# Patient Record
Sex: MALE | Race: WHITE | NOT HISPANIC OR LATINO
[De-identification: names, ages, dates, MRNs, and addresses within clinical notes are randomized per-mention and may not be internally consistent; named-entity substitution may affect disease eponyms.]

---

## 2018-12-07 PROBLEM — Z00.00 ENCOUNTER FOR PREVENTIVE HEALTH EXAMINATION: Status: ACTIVE | Noted: 2018-12-07

## 2019-01-03 ENCOUNTER — APPOINTMENT (OUTPATIENT)
Dept: OPHTHALMOLOGY | Facility: CLINIC | Age: 54
End: 2019-01-03
Payer: MEDICARE

## 2019-01-03 DIAGNOSIS — H53.30 UNSPECIFIED DISORDER OF BINOCULAR VISION: ICD-10-CM

## 2019-01-03 DIAGNOSIS — H43.393 OTHER VITREOUS OPACITIES, BILATERAL: ICD-10-CM

## 2019-01-03 PROCEDURE — 92134 CPTRZ OPH DX IMG PST SGM RTA: CPT

## 2019-01-03 PROCEDURE — 99204 OFFICE O/P NEW MOD 45 MIN: CPT

## 2019-01-03 PROCEDURE — 92083 EXTENDED VISUAL FIELD XM: CPT

## 2021-03-16 ENCOUNTER — TRANSCRIPTION ENCOUNTER (OUTPATIENT)
Age: 56
End: 2021-03-16

## 2022-06-20 ENCOUNTER — NON-APPOINTMENT (OUTPATIENT)
Age: 57
End: 2022-06-20

## 2023-08-12 ENCOUNTER — EMERGENCY (EMERGENCY)
Facility: HOSPITAL | Age: 58
LOS: 1 days | Discharge: HOME | End: 2023-08-12
Attending: EMERGENCY MEDICINE
Payer: MEDICARE

## 2023-08-12 VITALS
SYSTOLIC BLOOD PRESSURE: 150 MMHG | HEIGHT: 70 IN | WEIGHT: 188.94 LBS | RESPIRATION RATE: 18 BRPM | TEMPERATURE: 99 F | OXYGEN SATURATION: 100 % | DIASTOLIC BLOOD PRESSURE: 93 MMHG | HEART RATE: 99 BPM

## 2023-08-12 VITALS
DIASTOLIC BLOOD PRESSURE: 84 MMHG | HEART RATE: 92 BPM | SYSTOLIC BLOOD PRESSURE: 120 MMHG | RESPIRATION RATE: 18 BRPM | OXYGEN SATURATION: 98 %

## 2023-08-12 DIAGNOSIS — F43.22 ADJUSTMENT DISORDER WITH ANXIETY: ICD-10-CM

## 2023-08-12 LAB
ALBUMIN SERPL ELPH-MCNC: 4.9 G/DL — SIGNIFICANT CHANGE UP (ref 3.3–5)
ALP SERPL-CCNC: 62 U/L — SIGNIFICANT CHANGE UP (ref 40–120)
ALT FLD-CCNC: 29 U/L — SIGNIFICANT CHANGE UP (ref 10–45)
AMPHET UR-MCNC: NEGATIVE — SIGNIFICANT CHANGE UP
ANION GAP SERPL CALC-SCNC: 13 MMOL/L — SIGNIFICANT CHANGE UP (ref 5–17)
APAP SERPL-MCNC: <15 UG/ML — SIGNIFICANT CHANGE UP (ref 10–30)
APPEARANCE UR: CLEAR — SIGNIFICANT CHANGE UP
AST SERPL-CCNC: 25 U/L — SIGNIFICANT CHANGE UP (ref 10–40)
BARBITURATES UR SCN-MCNC: NEGATIVE — SIGNIFICANT CHANGE UP
BASOPHILS # BLD AUTO: 0.05 K/UL — SIGNIFICANT CHANGE UP (ref 0–0.2)
BASOPHILS NFR BLD AUTO: 0.9 % — SIGNIFICANT CHANGE UP (ref 0–2)
BENZODIAZ UR-MCNC: NEGATIVE — SIGNIFICANT CHANGE UP
BILIRUB SERPL-MCNC: 0.6 MG/DL — SIGNIFICANT CHANGE UP (ref 0.2–1.2)
BILIRUB UR-MCNC: NEGATIVE — SIGNIFICANT CHANGE UP
BUN SERPL-MCNC: 10 MG/DL — SIGNIFICANT CHANGE UP (ref 7–23)
CALCIUM SERPL-MCNC: 9.6 MG/DL — SIGNIFICANT CHANGE UP (ref 8.4–10.5)
CHLORIDE SERPL-SCNC: 102 MMOL/L — SIGNIFICANT CHANGE UP (ref 96–108)
CO2 SERPL-SCNC: 22 MMOL/L — SIGNIFICANT CHANGE UP (ref 22–31)
COCAINE METAB.OTHER UR-MCNC: NEGATIVE — SIGNIFICANT CHANGE UP
COLOR SPEC: SIGNIFICANT CHANGE UP
CREAT SERPL-MCNC: 1.12 MG/DL — SIGNIFICANT CHANGE UP (ref 0.5–1.3)
DIFF PNL FLD: NEGATIVE — SIGNIFICANT CHANGE UP
EGFR: 76 ML/MIN/1.73M2 — SIGNIFICANT CHANGE UP
EOSINOPHIL # BLD AUTO: 0.15 K/UL — SIGNIFICANT CHANGE UP (ref 0–0.5)
EOSINOPHIL NFR BLD AUTO: 2.6 % — SIGNIFICANT CHANGE UP (ref 0–6)
ETHANOL SERPL-MCNC: <10 MG/DL — SIGNIFICANT CHANGE UP (ref 0–10)
GLUCOSE SERPL-MCNC: 96 MG/DL — SIGNIFICANT CHANGE UP (ref 70–99)
GLUCOSE UR QL: NEGATIVE — SIGNIFICANT CHANGE UP
HCT VFR BLD CALC: 47.6 % — SIGNIFICANT CHANGE UP (ref 39–50)
HGB BLD-MCNC: 16.6 G/DL — SIGNIFICANT CHANGE UP (ref 13–17)
IMM GRANULOCYTES NFR BLD AUTO: 0.4 % — SIGNIFICANT CHANGE UP (ref 0–0.9)
KETONES UR-MCNC: NEGATIVE — SIGNIFICANT CHANGE UP
LEUKOCYTE ESTERASE UR-ACNC: NEGATIVE — SIGNIFICANT CHANGE UP
LYMPHOCYTES # BLD AUTO: 1.34 K/UL — SIGNIFICANT CHANGE UP (ref 1–3.3)
LYMPHOCYTES # BLD AUTO: 23.5 % — SIGNIFICANT CHANGE UP (ref 13–44)
MCHC RBC-ENTMCNC: 34.9 GM/DL — SIGNIFICANT CHANGE UP (ref 32–36)
MCHC RBC-ENTMCNC: 34.9 PG — HIGH (ref 27–34)
MCV RBC AUTO: 100 FL — SIGNIFICANT CHANGE UP (ref 80–100)
METHADONE UR-MCNC: NEGATIVE — SIGNIFICANT CHANGE UP
MONOCYTES # BLD AUTO: 0.5 K/UL — SIGNIFICANT CHANGE UP (ref 0–0.9)
MONOCYTES NFR BLD AUTO: 8.8 % — SIGNIFICANT CHANGE UP (ref 2–14)
NEUTROPHILS # BLD AUTO: 3.64 K/UL — SIGNIFICANT CHANGE UP (ref 1.8–7.4)
NEUTROPHILS NFR BLD AUTO: 63.8 % — SIGNIFICANT CHANGE UP (ref 43–77)
NITRITE UR-MCNC: NEGATIVE — SIGNIFICANT CHANGE UP
NRBC # BLD: 0 /100 WBCS — SIGNIFICANT CHANGE UP (ref 0–0)
OPIATES UR-MCNC: NEGATIVE — SIGNIFICANT CHANGE UP
OXYCODONE UR-MCNC: NEGATIVE — SIGNIFICANT CHANGE UP
PCP SPEC-MCNC: SIGNIFICANT CHANGE UP
PCP UR-MCNC: NEGATIVE — SIGNIFICANT CHANGE UP
PH UR: 8.5 — HIGH (ref 5–8)
PLATELET # BLD AUTO: 163 K/UL — SIGNIFICANT CHANGE UP (ref 150–400)
POTASSIUM SERPL-MCNC: 4.3 MMOL/L — SIGNIFICANT CHANGE UP (ref 3.5–5.3)
POTASSIUM SERPL-SCNC: 4.3 MMOL/L — SIGNIFICANT CHANGE UP (ref 3.5–5.3)
PROT SERPL-MCNC: 7.1 G/DL — SIGNIFICANT CHANGE UP (ref 6–8.3)
PROT UR-MCNC: NEGATIVE — SIGNIFICANT CHANGE UP
RBC # BLD: 4.76 M/UL — SIGNIFICANT CHANGE UP (ref 4.2–5.8)
RBC # FLD: 12.6 % — SIGNIFICANT CHANGE UP (ref 10.3–14.5)
SALICYLATES SERPL-MCNC: <2 MG/DL — LOW (ref 15–30)
SARS-COV-2 RNA SPEC QL NAA+PROBE: SIGNIFICANT CHANGE UP
SODIUM SERPL-SCNC: 137 MMOL/L — SIGNIFICANT CHANGE UP (ref 135–145)
SP GR SPEC: 1.01 — LOW (ref 1.01–1.02)
THC UR QL: NEGATIVE — SIGNIFICANT CHANGE UP
UROBILINOGEN FLD QL: NEGATIVE — SIGNIFICANT CHANGE UP
WBC # BLD: 5.7 K/UL — SIGNIFICANT CHANGE UP (ref 3.8–10.5)
WBC # FLD AUTO: 5.7 K/UL — SIGNIFICANT CHANGE UP (ref 3.8–10.5)

## 2023-08-12 PROCEDURE — 80053 COMPREHEN METABOLIC PANEL: CPT

## 2023-08-12 PROCEDURE — 93005 ELECTROCARDIOGRAM TRACING: CPT

## 2023-08-12 PROCEDURE — 87635 SARS-COV-2 COVID-19 AMP PRB: CPT

## 2023-08-12 PROCEDURE — 81003 URINALYSIS AUTO W/O SCOPE: CPT

## 2023-08-12 PROCEDURE — 80307 DRUG TEST PRSMV CHEM ANLYZR: CPT

## 2023-08-12 PROCEDURE — 99285 EMERGENCY DEPT VISIT HI MDM: CPT | Mod: 25

## 2023-08-12 PROCEDURE — 85025 COMPLETE CBC W/AUTO DIFF WBC: CPT

## 2023-08-12 PROCEDURE — 36415 COLL VENOUS BLD VENIPUNCTURE: CPT

## 2023-08-12 PROCEDURE — 99285 EMERGENCY DEPT VISIT HI MDM: CPT | Mod: GC

## 2023-08-12 RX ORDER — CLONAZEPAM 1 MG
0.5 TABLET ORAL ONCE
Refills: 0 | Status: DISCONTINUED | OUTPATIENT
Start: 2023-08-12 | End: 2023-08-12

## 2023-08-12 RX ADMIN — Medication 0.5 MILLIGRAM(S): at 17:28

## 2023-08-12 NOTE — ED PROVIDER NOTE - CLINICAL SUMMARY MEDICAL DECISION MAKING FREE TEXT BOX
49-year-old male past medical history of autism, ADD, OCD and prior history of suicidal ideation and gesture with psychiatric hospitalization in 2008, and 2 other hospitalizations in the 1990s and 1980s presenting with anxiety, stress, depression.  Patient denies being under current psychiatric care or under the care of a therapist currently.  Patient asking for Klonopin and another psychiatric medication at this time.  Overall, patient is feeling overwhelmed because of his duties to care for his mother at home and wishes to speak to a psychiatrist.  Patient notes chronic right thumb pain, but has no other medical complaints at this time. To get psych labs, psych consult. Reassess. 49-year-old male past medical history of autism, ADD, OCD and prior history of suicidal ideation and gesture with psychiatric hospitalization in 2008, and 2 other hospitalizations in the 1990s and 1980s presenting with anxiety, stress, depression.  Patient denies being under current psychiatric care or under the care of a therapist currently.  Patient asking for Klonopin and another psychiatric medication at this time.  Overall, patient is feeling overwhelmed because of his duties to care for his mother at home and wishes to speak to a psychiatrist.  Patient notes chronic right thumb pain, but has no other medical complaints at this time. To get psych labs, psych consult. Reassess.  Attending Regina Gonsalez: 50 yo male presenting with increased anxiety, stress. upon arrival pt awake and alert. denies any ingetsion or etoh use. nonfocal neurologic exam. concern for worsening depressions and anziety. will check labs, ekg d/w psych

## 2023-08-12 NOTE — ED PROVIDER NOTE - PATIENT PORTAL LINK FT
You can access the FollowMyHealth Patient Portal offered by [facility] by registering at the following website: [email or web address]. By joining [facility]’s FollowMyHealth portal, you will also be able to view your health information using other applications (apps) compatible with our system.

## 2023-08-12 NOTE — ED PROVIDER NOTE - ATTENDING CONTRIBUTION TO CARE
Attending MD Regina Gonsalez:  I personally have seen and examined this patient.  Resident note reviewed and agree on plan of care and except where noted.  See HPI, PE, and MDM for details.

## 2023-08-12 NOTE — ED BEHAVIORAL HEALTH ASSESSMENT NOTE - SAFETY PLAN ADDT'L DETAILS
Safety plan discussed with.../Education provided regarding environmental safety / lethal means restriction/Provision of National Suicide Prevention Lifeline 9-415-463-USKD (2218)

## 2023-08-12 NOTE — ED PROVIDER NOTE - PHYSICAL EXAMINATION
GENERAL: Awake, alert, NAD  HEENT: NC/AT, moist mucous membranes, PERRL, EOMI  LUNGS: CTAB, no wheezes or crackles   CARDIAC: RRR, no m/r/g  NEURO: A&Ox3. Moving all extremities.  SKIN: Warm and dry. No rash.  PSYCH: Pressured, tangential speech. No RIS. No AVH. GENERAL: Awake, alert, NAD  HEENT: NC/AT, moist mucous membranes, PERRL, EOMI  LUNGS: CTAB, no wheezes or crackles   CARDIAC: RRR, no m/r/g  NEURO: A&Ox3. Moving all extremities.  SKIN: Warm and dry. No rash.  PSYCH: Pressured, tangential speech. No RIS. No AVH.  Attending Regina Gonsalez: gen: nad, heent; atraumatic, mmm, op pink, perrla, cv: rrr, lungs; ctab, abd; soft, ext; wwp, neuro stable gait, awake and alert following commands, psych: +anxiety

## 2023-08-12 NOTE — ED PROVIDER NOTE - NSFOLLOWUPCLINICS_GEN_ALL_ED_FT
[facility]  Behavioral Health  [id number] [place] [address]  Phone: [phone]  Fax:

## 2023-08-12 NOTE — ED BEHAVIORAL HEALTH ASSESSMENT NOTE - SUMMARY
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    On exam pt is concrete and tangential, likely his baseline and can be redirected and adequately discuss and understand his situation and appreciate options. He appropriately describes increasing stressors at home that at times make him feel helpless and overwhelmed, but he currently denies passive/active SIIP, HIIP/VI, and there are no reported/observable symptoms of psychosis. Although he wants certain stressors to be addressed, he is not interested in psychiatric hospitalization and amenable to returning home. Pt does not meet standards for involuntary hospitalization at this time given his protective factors. Psychoeducation provided. Encouraged follow up with OP psych services.  No indication for emergent psych meds at this time. Pt verbalized understanding of plan and return precautions, collateral was contacted and agree that there are no acute safety concerns and this represents patient's baseline.

## 2023-08-12 NOTE — ED ADULT NURSE NOTE - OBJECTIVE STATEMENT
57 year old male BIB self with SI; A&O; +SI, no plan or intent at this time; denies AVH; denies ETOH and drug use; denies Axis III. This is a very loud but cooperative and somewhat tearful pt, compliant with protocols and valuables and clothing taken, CO begun. Pt states that he lives with his 98 year old mother who is being cared for at home and he is anxious about that situation, today he states "My brother was coming today and we don't always get along so I left" and then states he brought himself here for a psych eval, states he has never harmed self in past but he has been having increasing thoughts of suicide 2/2 to the stress of his mother's, states he is "on the autism spectrum" and takes Seroquel, Klonopin, Cymbalta, Trazadone and Anafranil, states his last admit was in 2008; continue to monitor. 57 year old male BIB self with SI; A&O; +SI, no plan or intent at this time; denies AVH; denies ETOH and drug use; denies Axis III. This is a very loud but cooperative and somewhat tearful pt, compliant with protocols and valuables and clothing taken, CO begun. Pt states that he lives with his 98 year old mother who is being cared for at home and he is anxious about that situation, today he states "My brother was coming today and we don't always get along so I left" and then states he brought himself here for a psych eval, states he has never harmed self in past but he has been having increasing thoughts of suicide 2/2 to the stress of his mother's care, states he is "on the autism spectrum" and takes Seroquel, Klonopin, Cymbalta, Trazadone and Anafranil, states he sees a psychiatrist "every 7 weeks", states his last admit was in 2008; continue to monitor.

## 2023-08-12 NOTE — ED BEHAVIORAL HEALTH ASSESSMENT NOTE - NSBHATTESTCOMMENTATTENDFT_PSY_A_CORE
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  ---    Collateral history obtained from brother Hever [phone] who notes patient was overwhelmed and tends to go on drives usually when he is frustrated with caregiver responsibilities. Notes his brother is future oriented and would never harm himself. States he tends to de-escalate rather quickly and believes this has been a common thing for him which he perceives is 2/2 the autism spectrum disorder. He is willing to come get his brother from the ED and will ensure he follows up with outpatient psychiatry/ psychology and has information about [facility] crisis clinic.    Patient does not wish for a voluntary psychiatric admission and does not meet criteria for an involuntary admission. Identifies his mother as a protective factor and notes he was just overwhelmed and adamantly denies any SI/HI/AVH and has no plan or intent of self-harm. Patient amenable to follow up with outpatient psychiatrist and will resume therapy when his psychologist returns. Safety plan completed, crisis intervention resources reviewed; pateint verbalized understanding and voiced agreement with above plan.

## 2023-08-12 NOTE — ED PROVIDER NOTE - OBJECTIVE STATEMENT
49-year-old male past medical history of autism, ADD, OCD and prior history of suicidal ideation and gesture with psychiatric hospitalization in 2008, and 2 other hospitalizations in the 1990s and 1980s presenting with anxiety, stress, depression.  Upon assessment, patient has pressured speech and is tangential.  Is not responding to internal stimuli.  Denies AVH or HI.  Notes that he did attempt to cut himself superficially during his 2008 hospitalization but has no current plan to harm himself.  Patient notes that he is taking care of his 98-year-old mother who has been in and out of rehab facilities.  He is supposed to get an aide, however "my brother says it is a government agency" so was not hopeful that the able provide additional help to his mother.  Patient also perseverating on the fact that he is single, never had a girlfriend.  He had a job but they reportedly fired him because he was not able to fully participate in his duties.  Patient denies being under current psychiatric care or under the care of a therapist currently.  Patient asking for Klonopin and another psychiatric medication at this time.  Overall, patient is feeling overwhelmed because of his duties to care for his mother at home and wishes to speak to a psychiatrist.  Patient notes chronic right thumb pain, but has no other medical complaints at this time.  Placed on a one-to-one for patient's safety.

## 2023-08-12 NOTE — ED PROVIDER NOTE - NSFOLLOWUPINSTRUCTIONS_ED_ALL_ED_FT
YOU WERE SEEN IN THE ED FOR: suicidal thoughts    PLEASE FOLLOW UP WITH YOUR PRIVATE PHYSICIAN WITHIN THE NEXT 48 HOURS. BRING COPIES OF YOUR RESULTS.    PLEASE FOLLOW UP WITH YOUR PSYCHIATRIST WITHIN 1 WEEK. INFORMATION FOR THE [facility]ISIS [facility]ENTER IS PROVIDED.    RETURN TO THE EMERGENCY DEPARTMENT IF YOU EXPERIENCE ANY NEW/CONCERNING/WORSENING SYMPTOMS.

## 2023-08-12 NOTE — ED BEHAVIORAL HEALTH ASSESSMENT NOTE - DETAILS
as above Emergency protocol reviewed with pt and family. Were advised to call 911 or come to the nearest ED if any acute safety concerns (ie symptoms worsen, having SI/HI). no current SIIP

## 2023-08-12 NOTE — ED BEHAVIORAL HEALTH ASSESSMENT NOTE - HPI (INCLUDE ILLNESS QUALITY, SEVERITY, DURATION, TIMING, CONTEXT, MODIFYING FACTORS, ASSOCIATED SIGNS AND SYMPTOMS)
57-year-old male w/ PPHx for autism spectrum d/o, ADD, OCD w/ at least three prior psychiatric hospitalizations ( 2008, 1980s, 1990s), no prior SA, currently receiving both outpatient psychiatric care and psychotherapy w/   ; now presenting due to worsening anxiety in context of being primary caregiver for his elderlyh mother and notes he walked into the ED as he needed to "get away from home." Per ED team patient endorsed passive SI and it was unclear if he had admitted to having hallucinations. 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    Pt seen laying comfortably in ED bed. On exam patient is cooperative and calm, but very concrete consistent with h/o ASD and developmental delay. He reports worsening anxiety and feeling overwhelmed due to his elderly mother returning home from rehab the day prior and being given increasingly more responsibilities such as helping her with ADLs, wound care and cooking. Additionally, he feels like his brother is not helping as much as he could re physically being there for them, and setting up a home health aide. Despite increased stressors, patient denies any active or passive suicidal ideation. Reports increased concern that he is in an "impossible situation" and fears at some point in the future he will not be able to care for his mother. However, he states that his sleep and appetite are good, and he is stable on his home medications. Tried to contact his therapist, but she was on vacation, but pt does admit he has another number he can call if he needs to speak with someone. 57-year-old male w/ PPHx for autism spectrum d/o, ADHD, OCD w/ at least three prior psychiatric hospitalizations ( 2008, 1980s, 1990s), no prior SA, currently receiving both outpatient psychiatric care and psychotherapy w/ Dr Jay Stern in [facility] (respectively); now presenting due to worsening anxiety in context of being primary caregiver for his elderly mother and notes he walked into the ED as he needed to "get away from home." Per ED team patient endorsed passive SI and it was unclear if he had admitted to having hallucinations.    Pt seen laying comfortably in ED bed. On exam patient is cooperative and calm, but very concrete and tangential consistent with h/o ASD and developmental delay. He reports worsening anxiety and feeling overwhelmed due to his elderly mother returning home from rehab the day prior and being given increasingly more responsibilities such as helping her with ADLs, wound care and cooking. Additionally, he feels like his brother is not helping as much as he could re physically being there for them, and setting up a home health aide. He reports being tearful when he first came to the ED, but no longer feels distressed. Despite increased stressors, patient denies any active or passive suicidal ideation. Reports increased concern that he is in an "impossible situation" and fears at some point in the future he will not be able to care for his mother. However, he states that his sleep and appetite are good, and he is stable on his home medications. Tried to contact his therapist, but she was on vacation, but pt does admit he has another number he can call if he needs to speak with someone. Endorses hopelessness about his situation, but appears hopeful that a HHA can be set up, or his brother can provide more support. Denies other mood symptoms, no cleo depression, no anhedonia (still interested in following sports), denies current OCD symptoms. No paranoia, AVH including command auditory hallucinations. Patient describes past psychiatric hospitalizations (last in 2008) including symptoms of OCD and some paranoia in s/o ASD and developmental delay. Since then he has been stable on his outpatient medications.     Patient does not want psychiatric admission and is happy with his outpatient care. Although he wants certain stressors to be addressed, he understands the writer's limitations and agreeable to returning home. Pt was concerned that he missed his afternoon medications, but felt it was too late because his night meds are at 7, suggested he take his afternoon Klonopin 0.5mg now, writer agreed.

## 2023-08-12 NOTE — ED BEHAVIORAL HEALTH ASSESSMENT NOTE - DESCRIPTION
Pt seen sitting/laying/standing...     On exam, pt reports...     [place]     ROS: Reports good mood, denies changes in sleep, appetite, motivation. Denies hopelessness, passive/active SI, anhedonia, changes in energy or concentration. Denies any paranoia, referential thinking, TW/TI, delusions of control, focus on physical sensations/body, AVH, VI/HI. Denies s/s arnulfo. Denies any recent alcohol, cannabis, nicotine or other illicit substance use. Does take...meds/supplements. Does say he saw..outpatient providers.          What does pt want? Patient does not want psychiatric admission, is interested in receiving referrals so he can continue with care in the community. Patient calm and cooperative in ED so far, No prns required. Primary caregiver to 99yo mother, brother also involved in care and lives nearby. Pt reports none.

## 2023-08-12 NOTE — ED BEHAVIORAL HEALTH ASSESSMENT NOTE - RISK ASSESSMENT
Risk factors include: single, male, some helplessness, anxiety, acute psychosocial stressors, poor reactivity to stressors. Chronic risk factors for suicide include: h/o prior psychiatric admissions, diagnosis of ASD, ADHD, OCD. Protective factors include: no current active/passive SIIP, no NSSIB, no h/o SA, identifies reasons for living, future oriented/help seeking, engaged in outpatient care, has been stable on home medications since 2008, identifies reasons for living, no psychotic symptoms (ie no CAH).     He is concerned with future possibility of frustration and passive SI and feeling helpless over not getting HHA fast enough, but he currently denies passive/active SIIP, is future oriented and continues to enjoy watching sports. He identifies his mother as a protective factor, and is engaged in outpatient care and feels his home medications are meeting his needs. Overall, pt is a low risk of harm to self/others and does not meet criteria for psychiatric admission.

## 2023-08-12 NOTE — ED PROVIDER NOTE - PROGRESS NOTE DETAILS
Attending (Romario Darling D.O.):  Patient signed out to me, anxious, irritable but redirectable, here for passive SI with increasing suicidal thoughts. Prior hospitalization in 2008. Psych consulted, angel gordon. Attending (Romario Darling D.O.):  psych cleared patient, brother to  patient. Strict return precautions given w. verbal understanding expressed.

## 2023-08-12 NOTE — ED PROVIDER NOTE - NS ED ROS FT
CONST: no fevers, no chills  EYES: no pain, no vision changes  ENT: no sore throat, no ear pain, no change in hearing  CV: no chest pain, no leg swelling  RESP: no shortness of breath, no cough  ABD: no abdominal pain, no nausea, no vomiting, no diarrhea  : no dysuria, no flank pain, no hematuria  MSK: no back pain, +R thumb pain  NEURO: no headache or additional neurologic complaints  HEME: no easy bleeding  SKIN:  no rash   PSYCH: +anxiety, +depression, +stress

## 2023-08-12 NOTE — ED BEHAVIORAL HEALTH ASSESSMENT NOTE - OTHER PAST PSYCHIATRIC HISTORY (INCLUDE DETAILS REGARDING ONSET, COURSE OF ILLNESS, INPATIENT/OUTPATIENT TREATMENT)
Autism spectrum d/o, ADHD, OCD w/ at least three prior psychiatric hospitalizations ( 2008, 1980s, 1990s) for anxiety and OCD symptoms, remote passive SI. No prior SA, no NSSIB. Connected with outpatient care since teens.

## 2023-08-25 ENCOUNTER — EMERGENCY (EMERGENCY)
Facility: HOSPITAL | Age: 58
LOS: 1 days | Discharge: HOME | End: 2023-08-25
Attending: STUDENT IN AN ORGANIZED HEALTH CARE EDUCATION/TRAINING PROGRAM
Payer: MEDICARE

## 2023-08-25 VITALS
TEMPERATURE: 99 F | HEART RATE: 86 BPM | OXYGEN SATURATION: 98 % | HEIGHT: 70 IN | DIASTOLIC BLOOD PRESSURE: 91 MMHG | WEIGHT: 188.94 LBS | RESPIRATION RATE: 20 BRPM | SYSTOLIC BLOOD PRESSURE: 137 MMHG

## 2023-08-25 DIAGNOSIS — F90.9 ATTENTION-DEFICIT HYPERACTIVITY DISORDER, UNSPECIFIED TYPE: ICD-10-CM

## 2023-08-25 DIAGNOSIS — F42.2 MIXED OBSESSIONAL THOUGHTS AND ACTS: ICD-10-CM

## 2023-08-25 DIAGNOSIS — F43.22 ADJUSTMENT DISORDER WITH ANXIETY: ICD-10-CM

## 2023-08-25 DIAGNOSIS — F84.0 AUTISTIC DISORDER: ICD-10-CM

## 2023-08-25 LAB
ALBUMIN SERPL ELPH-MCNC: 4.9 G/DL — SIGNIFICANT CHANGE UP (ref 3.3–5)
ALP SERPL-CCNC: 62 U/L — SIGNIFICANT CHANGE UP (ref 40–120)
ALT FLD-CCNC: 21 U/L — SIGNIFICANT CHANGE UP (ref 10–45)
ANION GAP SERPL CALC-SCNC: 12 MMOL/L — SIGNIFICANT CHANGE UP (ref 5–17)
APAP SERPL-MCNC: <15 UG/ML — SIGNIFICANT CHANGE UP (ref 10–30)
APPEARANCE UR: CLEAR — SIGNIFICANT CHANGE UP
AST SERPL-CCNC: 22 U/L — SIGNIFICANT CHANGE UP (ref 10–40)
BASOPHILS # BLD AUTO: 0.06 K/UL — SIGNIFICANT CHANGE UP (ref 0–0.2)
BASOPHILS NFR BLD AUTO: 0.9 % — SIGNIFICANT CHANGE UP (ref 0–2)
BILIRUB SERPL-MCNC: 0.6 MG/DL — SIGNIFICANT CHANGE UP (ref 0.2–1.2)
BILIRUB UR-MCNC: NEGATIVE — SIGNIFICANT CHANGE UP
BUN SERPL-MCNC: 7 MG/DL — SIGNIFICANT CHANGE UP (ref 7–23)
CALCIUM SERPL-MCNC: 9.4 MG/DL — SIGNIFICANT CHANGE UP (ref 8.4–10.5)
CHLORIDE SERPL-SCNC: 102 MMOL/L — SIGNIFICANT CHANGE UP (ref 96–108)
CO2 SERPL-SCNC: 25 MMOL/L — SIGNIFICANT CHANGE UP (ref 22–31)
COLOR SPEC: SIGNIFICANT CHANGE UP
CREAT SERPL-MCNC: 1.11 MG/DL — SIGNIFICANT CHANGE UP (ref 0.5–1.3)
DIFF PNL FLD: NEGATIVE — SIGNIFICANT CHANGE UP
EGFR: 76 ML/MIN/1.73M2 — SIGNIFICANT CHANGE UP
EOSINOPHIL # BLD AUTO: 0.19 K/UL — SIGNIFICANT CHANGE UP (ref 0–0.5)
EOSINOPHIL NFR BLD AUTO: 2.8 % — SIGNIFICANT CHANGE UP (ref 0–6)
ETHANOL SERPL-MCNC: <10 MG/DL — SIGNIFICANT CHANGE UP (ref 0–10)
GLUCOSE SERPL-MCNC: 107 MG/DL — HIGH (ref 70–99)
GLUCOSE UR QL: NEGATIVE — SIGNIFICANT CHANGE UP
HCT VFR BLD CALC: 46.9 % — SIGNIFICANT CHANGE UP (ref 39–50)
HGB BLD-MCNC: 16.2 G/DL — SIGNIFICANT CHANGE UP (ref 13–17)
IMM GRANULOCYTES NFR BLD AUTO: 0.4 % — SIGNIFICANT CHANGE UP (ref 0–0.9)
KETONES UR-MCNC: NEGATIVE — SIGNIFICANT CHANGE UP
LEUKOCYTE ESTERASE UR-ACNC: NEGATIVE — SIGNIFICANT CHANGE UP
LYMPHOCYTES # BLD AUTO: 1.78 K/UL — SIGNIFICANT CHANGE UP (ref 1–3.3)
LYMPHOCYTES # BLD AUTO: 26.1 % — SIGNIFICANT CHANGE UP (ref 13–44)
MCHC RBC-ENTMCNC: 34.5 GM/DL — SIGNIFICANT CHANGE UP (ref 32–36)
MCHC RBC-ENTMCNC: 34.7 PG — HIGH (ref 27–34)
MCV RBC AUTO: 100.4 FL — HIGH (ref 80–100)
MONOCYTES # BLD AUTO: 0.54 K/UL — SIGNIFICANT CHANGE UP (ref 0–0.9)
MONOCYTES NFR BLD AUTO: 7.9 % — SIGNIFICANT CHANGE UP (ref 2–14)
NEUTROPHILS # BLD AUTO: 4.22 K/UL — SIGNIFICANT CHANGE UP (ref 1.8–7.4)
NEUTROPHILS NFR BLD AUTO: 61.9 % — SIGNIFICANT CHANGE UP (ref 43–77)
NITRITE UR-MCNC: NEGATIVE — SIGNIFICANT CHANGE UP
NRBC # BLD: 0 /100 WBCS — SIGNIFICANT CHANGE UP (ref 0–0)
PCP SPEC-MCNC: SIGNIFICANT CHANGE UP
PH UR: 7 — SIGNIFICANT CHANGE UP (ref 5–8)
PLATELET # BLD AUTO: 164 K/UL — SIGNIFICANT CHANGE UP (ref 150–400)
POTASSIUM SERPL-MCNC: 4 MMOL/L — SIGNIFICANT CHANGE UP (ref 3.5–5.3)
POTASSIUM SERPL-SCNC: 4 MMOL/L — SIGNIFICANT CHANGE UP (ref 3.5–5.3)
PROT SERPL-MCNC: 7.2 G/DL — SIGNIFICANT CHANGE UP (ref 6–8.3)
PROT UR-MCNC: NEGATIVE — SIGNIFICANT CHANGE UP
RBC # BLD: 4.67 M/UL — SIGNIFICANT CHANGE UP (ref 4.2–5.8)
RBC # FLD: 12.7 % — SIGNIFICANT CHANGE UP (ref 10.3–14.5)
SALICYLATES SERPL-MCNC: <2 MG/DL — LOW (ref 15–30)
SARS-COV-2 RNA SPEC QL NAA+PROBE: SIGNIFICANT CHANGE UP
SODIUM SERPL-SCNC: 139 MMOL/L — SIGNIFICANT CHANGE UP (ref 135–145)
SP GR SPEC: 1.01 — LOW (ref 1.01–1.02)
UROBILINOGEN FLD QL: NEGATIVE — SIGNIFICANT CHANGE UP
WBC # BLD: 6.82 K/UL — SIGNIFICANT CHANGE UP (ref 3.8–10.5)
WBC # FLD AUTO: 6.82 K/UL — SIGNIFICANT CHANGE UP (ref 3.8–10.5)

## 2023-08-25 PROCEDURE — 99285 EMERGENCY DEPT VISIT HI MDM: CPT | Mod: 25

## 2023-08-25 PROCEDURE — 80307 DRUG TEST PRSMV CHEM ANLYZR: CPT

## 2023-08-25 PROCEDURE — 87635 SARS-COV-2 COVID-19 AMP PRB: CPT

## 2023-08-25 PROCEDURE — 93005 ELECTROCARDIOGRAM TRACING: CPT

## 2023-08-25 PROCEDURE — 90792 PSYCH DIAG EVAL W/MED SRVCS: CPT | Mod: 95

## 2023-08-25 PROCEDURE — 81003 URINALYSIS AUTO W/O SCOPE: CPT

## 2023-08-25 PROCEDURE — 80053 COMPREHEN METABOLIC PANEL: CPT

## 2023-08-25 PROCEDURE — 99285 EMERGENCY DEPT VISIT HI MDM: CPT

## 2023-08-25 PROCEDURE — 85025 COMPLETE CBC W/AUTO DIFF WBC: CPT

## 2023-08-25 NOTE — ED BEHAVIORAL HEALTH ASSESSMENT NOTE - DETAILS
the patient denies prior SA Triggers/warning signs: increased anxiety; Coping: going for a walk with dog, swimming; Support: brother (Hever), therapist, Christen; Emergency: call [phone] or 911 or go to the nearest ED; Lives for: mother, dog spoke to patient's brother

## 2023-08-25 NOTE — ED ADULT NURSE REASSESSMENT NOTE - NS ED NURSE REASSESS COMMENT FT1
pt cleared for dc by psych. brother came to  pt, pt dc'ed with belongings returned with brother at side.

## 2023-08-25 NOTE — ED BEHAVIORAL HEALTH NOTE - BEHAVIORAL HEALTH NOTE
==================             PRE-HOSPITAL COURSE             ===================            SOURCE:  Secondhand EMR documentation.             DETAILS:  Patient BIB self to ED: chief complaint of Anxiety/panic attack.       ===========      ED COURSE:            ===========             SOURCE:  RN and secondhand EMR documentation.              ARRIVAL:  Patient noted to be cooperative with ED protocol. Patient gowned, wanded, and placed in private room on 1:1 for consult. Patient presents with good hygiene/grooming.              BELONGINGS:  None notable.              BEHAVIOR: Blood/urine provided for routine labs without noted incident. No SI/HI/AH/VH elicited per RN. Patient is alert, orientedxx4, and makes eye contact; speech of normal volume and rate accompanied by logical thought process. Patient has been in hospital bed while in ED; presents as calm and interacting appropriately with ED staff.         TREATMENT: Patient has not required medication intervention while in ED; remains in behavioral control.      Visitors: Patient presently unaccompanied by social supports while in ED.

## 2023-08-25 NOTE — ED PROVIDER NOTE - OBJECTIVE STATEMENT
58M past medical history of autism, ADD, OCD and prior history of suicidal ideation and gesture with psychiatric hospitalizations most recently in 2008, presenting with 36h of intense anxiety symptoms. Reports increased anxiety in caring for his 98y mother who he lives with and conflicts with his brother over her care.  Endorsing intrusive thoughts of hurting himself, but does not want to act on these impulses. No suicidal intent or plan. Denies AVH or HI. Patient took his usual psych medications (seroquel, clonopin, cymbalta and trazadone), without any improvement in his symptoms. Has appointments with his outpatient psychiatrist every ~3w, did not contact him about his recent increase in anxiety. Was seen in the [facility] ED 2w ago for similar symptoms. 58M past medical history of autism, ADD, OCD and prior history of suicidal ideation and gesture with psychiatric hospitalizations most recently in 2008, presenting with 36h of intense anxiety symptoms. Reports increased anxiety in caring for his 98y mother who he lives with and conflicts with his brother over her care.  Endorsing intrusive thoughts of hurting himself, but does not want to act on these impulses. No suicidal intent or plan. Denies AVH or HI. Patient took his usual psych medications (seroquel, clonopin, cymbalta and trazadone), without any improvement in his symptoms which caused him distress and prompted him to come to the ER. Has appointments with his outpatient psychiatrist every ~3w, did not contact him prior to arriving. Was seen in the [facility] ED 2w ago for similar symptoms. No drug use, very occasional alcohol use.

## 2023-08-25 NOTE — ED BEHAVIORAL HEALTH ASSESSMENT NOTE - ADDITIONAL DETAILS ALL
no prior SA, NSSIt age 20 he tried to cut self with a knife because father wouldn’t do something, and he wanted to get attention

## 2023-08-25 NOTE — ED BEHAVIORAL HEALTH ASSESSMENT NOTE - OTHER PAST PSYCHIATRIC HISTORY (INCLUDE DETAILS REGARDING ONSET, COURSE OF ILLNESS, INPATIENT/OUTPATIENT TREATMENT)
Diagnoses: ASD, ADHD, OCD     Inpatient: 3 prior admissions (2008, 1990s, 1980s)     Outpatient: Dr. Duffy psychiatrist (sees him every 7-8 weeks) - next appointment 9/13/2023, Christen therapist (sees her weekly) - next appointment on 8/29/2023     SA: none      NSSI: at age 20 he tried to cut self with a knife because father wouldn’t do something, and he wanted to get attention

## 2023-08-25 NOTE — ED BEHAVIORAL HEALTH ASSESSMENT NOTE - DESCRIPTION
The patient has been calm, cooperative, not requiring medication. lives with mother, never , no children, not employed no none

## 2023-08-25 NOTE — ED ADULT NURSE NOTE - OBJECTIVE STATEMENT
58 year old male BIB self with SI; A&O; +SI, no plan or intent at this time; denies AVH; denies ETOH and drug use. This is a cooperative anxious pt, TP somewhat concrete, wanding done, clothes and valuables taken, CO begun. Pt is well known to this ED for similar visits in the past, pt came a few weeks ago, he is in the autism spectrum and takes Seroquel, Klonopin, Trazadone and is compliant, he lives with his 98 year old mother and worries about her care even though she has an aide, states he doesn't always get along with his brother who he doesn't live with but who is involved with caring for his mother, today he brought himself to ER with anxiety that started a few days ago when his brother wanted him to "not always use microwave meals and he showed me how to prepare food for my mother" he sees a psychiatrist but feels they are not helping him with his home situation; continue to monitor,

## 2023-08-25 NOTE — ED PROVIDER NOTE - CLINICAL SUMMARY MEDICAL DECISION MAKING FREE TEXT BOX
58M past medical history of autism, ADD, OCD and prior history of suicidal ideation and gesture with psychiatric hospitalizations most recently in 2008, presenting with 36h of intense anxiety symptoms, primarily related to his responsibilities in caring for his 97yo mother. Denying any acute suicidal intent or plan. No HI or AVH. Denying any medical complaints. Will get psych labs and consult psych.

## 2023-08-25 NOTE — ED BEHAVIORAL HEALTH ASSESSMENT NOTE - HPI (INCLUDE ILLNESS QUALITY, SEVERITY, DURATION, TIMING, CONTEXT, MODIFYING FACTORS, ASSOCIATED SIGNS AND SYMPTOMS)
The patient is a 58-year-old man, single, unemployed, lives with mother, with no significant PMH and with PPH of ASD, ADHD, OCD, 3 prior hospitalizations (2008, 1990s, 1980s), no prior SA, history of NSSI at age 20, no legal history, no substance abuse, in outpatient treatment with Dr. Duffy (psychiatrist) and Christen (therapist), who brought himself in for worsening anxiety.    The patient presents oddly related, constricted in affect, though pleasant, answering questions appropriately in a concrete manner, visibly anxious though not in acute distress.    The patient states that he has been feeling increasingly anxious for the past day and cites several things as stressors. Stressors include his 98-year-old mother is hard to care for and his brother wants him to learn how to cook and he isn’t sure if he can do it to his level. He wants to start learning to cook certain things and to progress. He also finds it difficult to determine new interests which stems from being bullied while he was a child in school. He also feels lonely in not having any romantic partner or children.    The patient was here 13 days ago for a similar presentation and felt that since then, he has not had an improvement in his anxiety. He has tried calling his therapist which wasn't as helpful. He has been having dreams where he is depressed and having suicidal thoughts in his dreams but denies having thoughts awake. He denies any plan or intention. He cites his mother, brother, and dog as strong protective factors. He is also hopeful in finding a hobby that he can dedicate time to. The patient feels that since his mother has come back from rehab, he has been more anxious about having to take care of her. There is no current home health aide but he has been told by his brother that there will be one soon which gives him some relief. He denies any HI. He denies any AVH, PI.    Outpatient treatment: Dr. Duffy psychiatrist (sees him every 7-8 weeks) - next appointment 9/13/2023, Christen therapist (sees her weekly) - next appointment on 8/29/2023.    Collateral from patient's brother in separate note.

## 2023-08-25 NOTE — ED ADULT NURSE NOTE - NSFALLUNIVINTERV_ED_ALL_ED
Bed/Stretcher in lowest position, wheels locked, appropriate side rails in place/Call bell, personal items and telephone in reach/Instruct patient to call for assistance before getting out of bed/chair/stretcher/Non-slip footwear applied when patient is off stretcher/[place] to call system/Physically safe environment - no spills, clutter or unnecessary equipment/Purposeful proactive rounding/Room/bathroom lighting operational, light cord in reach

## 2023-08-25 NOTE — ED PROVIDER NOTE - NSFOLLOWUPINSTRUCTIONS_ED_ALL_ED_FT
You were seen in the emergency department today for anxiousness.     Continue home medications and outpatient follow up: Dr. Dufyf psychiatrist (sees him every 7-8 weeks) - next appointment 9/13/2023, Christen therapist (sees her weekly) - next appointment on 8/29/2023    If you develop worsening psychiatric symptoms- anxiousness, thoughts of wanting to harm yourself or orthers, or any other symptoms, please call your psychiatrist, go to [facility] or come to the emergency department.     [facility] Information:    -Walk-in hours: Monday to Friday, 9am to 3pm   -Almost all walk-in patients will be able to see a psych prescriber the same day   -Scheduled, non-urgent, evening remote/virtual appointments are available on a limited basis. Call our  to inquire about these: [phone]. A crisis center clinician screens these requests in the late afternoon and if appropriate it takes a few days to set-up.   -Visits take about 2 to 4 hours total   -Mornings are the best time for patients to arrive    For Telehealth options try:  [facility]: [email or web address] (to access psychiatrist or therapist)  [facility]: [email or web address] (to access psychiatrist or therapist)  [facility]: [email or web address] (Largest online therapy group)    If you develop chest pain, abdominal pain, shortness of breath, lightheadedness, vomiting, leg swelling, fever, headache, slurred speech, weakness or blurry vision or any other medical concerns we recommend that you return to the emergency department.

## 2023-08-25 NOTE — ED BEHAVIORAL HEALTH ASSESSMENT NOTE - CURRENT MEDICATION
Klonopin 0.5mg three times a day, Seroquel 100mg at 1PM and 200mg at 7PM, Cymbalta 20mg? nightly, Trazodone 100mg? Nightly, Clomipramine 50mg nightly

## 2023-08-25 NOTE — ED PROVIDER NOTE - PHYSICAL EXAMINATION
Const: no acute distress, Well-developed, Eyes: no conjunctival injection and no scleral icterus ENMT: Moist mucus membranes, CVS: +S1/S2, radial pulse 2+ bilaterally RESP: Unlabored respiratory effort, Clear to auscultation bilaterally GI: Nontender/Nondistended soft abdomen, no CVA tenderness MSK: Extremities w/o deformity or ttp, Psych: tangential w/ flight of ideas, cooperative

## 2023-08-25 NOTE — ED PROVIDER NOTE - PATIENT PORTAL LINK FT
You can access the FollowMyHealth Patient Portal offered by [facility] by registering at the following website: [email or web address]. By joining [employer]’s FollowMyHealth portal, you will also be able to view your health information using other applications (apps) compatible with our system.

## 2023-08-25 NOTE — ED PROVIDER NOTE - PROGRESS NOTE DETAILS
seen by psychiatry ok for treat and release, pt states he feels embarrassed for being here and requiring psychiatry consult, brother to come take son home,

## 2023-08-25 NOTE — ED PROVIDER NOTE - ATTENDING CONTRIBUTION TO CARE
58 M w/ hx of autism, ADD< OCD prior hx of suicidal ideatoin w/ prior hospital stay in 2008 here w/ worsening anxiety pt reports he is care take of his 98 year old mother and is having thoughts of wanting to harm himself, pt w/ no cp, no sob.   On exam, Const: no acute distress, Well-developed, Eyes: no conjunctival injection and no scleral icterus ENMT: Moist mucus membranes, CVS: +S1/S2, radial pulse 2+ bilaterally RESP: Unlabored respiratory effort, Clear to auscultation bilaterally GI: Nontender/Nondistended soft abdomen, no CVA tenderness MSK: Extremities w/o deformity or ttp, Psych: tangential w/ flight of ideas, cooperative  Plan for labs imaging and reassessment, dispo pending psych consult for anxiety and for thoughts of wanting to harm himself likely finding c/w increased stress however pt w/ poor social support, will discuss w/ psych and reassess pt for safety

## 2023-08-25 NOTE — ED BEHAVIORAL HEALTH NOTE - BEHAVIORAL HEALTH NOTE
========================    FOR EACH COLLATERAL    ========================    NAME:     NUMBER:     RELATIONSHIP:     RELIABILITY:     COMMENTS:     ========================    PATIENT DEMOGRAPHICS:    ========================    HPI    BASELINE FUNCTIONING:    DATE HPI STARTED:    DECOMPENSATION:    SUICIDALITY    VIOLENCE:    SUBSTANCE:    ========================    PAST PSYCHIATRIC HISTORY    ========================    DATE PAST PSYCHIATRIC HISTORY STARTED:     MAIN PSYCHIATRIC DIAGNOSIS:    PSYCHIATRIC HOSPITALIZATIONS:    PRIOR ILLNESS:    SUICIDALITY:    VIOLENCE:    SUBSTANCE USE:    ==============    OTHER HISTORY    ==============    CURRENT MEDICATION:    MEDICAL HISTORY:    ALLERGIES    LEGAL ISSUES:    FIREARM ACCESS:    SOCIAL HISTORY:    FAMILY HISTORY:    DEVELOPMENTAL HISTORY: ========================    FOR EACH COLLATERAL    ========================    NAME: Hveer Guadalupe     NUMBER: [phone]     RELATIONSHIP: brother     RELIABILITY: unknown     COMMENTS: 21:37 SW attempted to reach collateral on first attempt without success. A voicemail was left with a callback number requesting return phone call to telepsychiatry. ========================    FOR EACH COLLATERAL    ========================    NAME: Hever Guadalupe     NUMBER: [phone]     RELATIONSHIP: brother     RELIABILITY: fair     COMMENTS: 21:37 SW attempted to reach collateral on first attempt without success. A voicemail was left with a callback number requesting return phone call to telepsychiatry. 22:07 SW spoke to collateral via callback - no acute safety concerns reported. Collateral has no objections to pt being discharged.       ========================    PATIENT DEMOGRAPHICS: 59yo domiciled with mother, single, no children, unemployed, pphx ASD, FERDINAND, 1 known prior IPP, no known prior SA, no known hx of violence, no known hx of substance use.     ========================    HPI    BASELINE FUNCTIONING: collateral reports that pt is chronically socially isolated. Pt resides with mother and is primarily responsible for her care-taking needs and taking care of the dog. Pt struggles with anxiety sx per collateral.     DATE HPI STARTED: unknown to collateral.     DECOMPENSATION:    SUICIDALITY    VIOLENCE:    SUBSTANCE:    ========================    PAST PSYCHIATRIC HISTORY    ========================    DATE PAST PSYCHIATRIC HISTORY STARTED:     MAIN PSYCHIATRIC DIAGNOSIS:    PSYCHIATRIC HOSPITALIZATIONS:    PRIOR ILLNESS:    SUICIDALITY:    VIOLENCE:    SUBSTANCE USE:    ==============    OTHER HISTORY    ==============    CURRENT MEDICATION:    MEDICAL HISTORY:    ALLERGIES    LEGAL ISSUES:    FIREARM ACCESS:    SOCIAL HISTORY:    FAMILY HISTORY:    DEVELOPMENTAL HISTORY: ========================    FOR EACH COLLATERAL    ========================    NAME: Hever Guadalupe     NUMBER: [phone]     RELATIONSHIP: brother     RELIABILITY: fair     COMMENTS: 21:37 SW attempted to reach collateral on first attempt without success. A voicemail was left with a callback number requesting return phone call to telepsychiatry. 22:07 SW spoke to collateral via callback - no acute safety concerns reported. Collateral has no objections to pt being discharged.       ========================    PATIENT DEMOGRAPHICS: 59yo domiciled with mother, single, no children, unemployed, pphx ASD, FERDINAND, 1 known prior IPP, no known prior SA, no known hx of violence, no known hx of substance use.     ========================    HPI    BASELINE FUNCTIONING: collateral reports that pt is chronically socially isolated. Pt resides with mother and is primarily responsible for her care-taking needs and taking care of the dog. Pt struggles with anxiety sx per collateral.     DATE HPI STARTED: unknown to collateral. Collateral states that he received a phone call from [facility] stating that pt was brought to the ED, however collateral is unaware of the events precipitating this.     DECOMPENSATION: unknown to collateral. Per collateral, pt intermittently becomes increasingly anxious and overwhelmed secondary due FERDINAND and ASD. Collateral reports that this is baseline presentation and that there are no acute concerns for pt's safety at home or in the community. Collateral believes that pt is compliant with medication as he has observed pt taking them consistently. Pt has also been compliant with therapy sessions.     SUICIDALITY: collateral denies any recent reports of SI. No known hx of SA.     VIOLENCE: collateral denies.     SUBSTANCE: collateral denies.      ========================    PAST PSYCHIATRIC HISTORY    ========================    DATE PAST PSYCHIATRIC HISTORY STARTED:     MAIN PSYCHIATRIC DIAGNOSIS:    PSYCHIATRIC HOSPITALIZATIONS:    PRIOR ILLNESS:    SUICIDALITY: collateral denies any recent reports of SI. No known hx of SA.     VIOLENCE: collateral denies.     SUBSTANCE: collateral denies.     ==============    OTHER HISTORY    ==============    CURRENT MEDICATION:    MEDICAL HISTORY:    ALLERGIES    LEGAL ISSUES:    FIREARM ACCESS:    SOCIAL HISTORY:    FAMILY HISTORY:    DEVELOPMENTAL HISTORY: ========================    FOR EACH COLLATERAL    ========================    NAME: Hever Guadalupe     NUMBER: [phone]     RELATIONSHIP: brother     RELIABILITY: fair     COMMENTS: 21:37 SW attempted to reach collateral on first attempt without success. A voicemail was left with a callback number requesting return phone call to telepsychiatry. 22:07 SW spoke to collateral via callback - no acute safety concerns reported. Collateral has no objections to pt being discharged.       ========================    PATIENT DEMOGRAPHICS: 57yo domiciled with mother, single, no children, unemployed, pphx ASD, FERDINAND, 1 known prior IPP, no known prior SA, no known hx of violence, no known hx of substance use.     ========================    HPI    BASELINE FUNCTIONING: collateral reports that pt is chronically socially isolated. Pt resides with mother and is primarily responsible for her care-taking needs and taking care of the dog. Pt struggles with anxiety sx per collateral.     DATE HPI STARTED: unknown to collateral. Collateral states that he received a phone call from [facility] stating that pt was brought to the ED, however collateral is unaware of the events precipitating this.     DECOMPENSATION: unknown to collateral. Per collateral, pt intermittently becomes increasingly anxious and overwhelmed secondary due FERDINAND and ASD. Collateral reports that this is baseline presentation and that there are no acute concerns for pt's safety at home or in the community. Collateral believes that pt is compliant with medication as he has observed pt taking them consistently. Pt has also been compliant with therapy sessions.     SUICIDALITY: collateral denies any recent reports of SI. No known hx of SA.     VIOLENCE: collateral denies.     SUBSTANCE: collateral denies.      ========================    PAST PSYCHIATRIC HISTORY    ========================    DATE PAST PSYCHIATRIC HISTORY STARTED: collateral states that pt is followed by a therapist/psychiatrist Dr. Jay Stern. Pt has not been admitted to IPP since 2008 per collateral. Pt is reportedly compliant with medications including Seroquel, Cymbalta, Klonopin, & Trazodone.     MAIN PSYCHIATRIC DIAGNOSIS: ASD, FERDINAND, OCD     PSYCHIATRIC HOSPITALIZATIONS: 1 prior [facility] admission in 2008.     SUICIDALITY: collateral denies any recent reports of SI. No known hx of SA.     VIOLENCE: collateral denies.     SUBSTANCE: collateral denies.     ==============    OTHER HISTORY    ==============    CURRENT MEDICATION: Seroquel, Cymbalta, Klonopin, Trazodone.    MEDICAL HISTORY: no PMH reported.     ALLERGIES: collateral denies.     LEGAL ISSUES: collateral denies.    FIREARM ACCESS: collateral denies.     SOCIAL HISTORY: collateral denies.    FAMILY HISTORY: collateral denies.      DEVELOPMENTAL HISTORY: hx of ASD.

## 2023-08-25 NOTE — ED PROVIDER NOTE - NSFOLLOWUPCLINICS_GEN_ALL_ED_FT
[facility]  Psychiatry  [address] [place] [address]  Phone: [phone]  Fax:      Unknown

## 2023-08-25 NOTE — ED BEHAVIORAL HEALTH ASSESSMENT NOTE - RISK ASSESSMENT
Risk: prior hospitalizations, history of NSSI, unemployed  Protective: no prior SA, has outpatient follow-up, has stable housing and supportive family, has beloved pet, future oriented

## 2023-08-25 NOTE — ED BEHAVIORAL HEALTH ASSESSMENT NOTE - SUMMARY
The patient is a 58-year-old man, single, unemployed, lives with mother, with no significant PMH and with PPH of ASD, ADHD, OCD, 3 prior hospitalizations (2008, 1990s, 1980s), no prior SA, history of NSSI at age 20, no legal history, no substance abuse, in outpatient treatment with Dr. Duffy (psychiatrist) and Christen (therapist), who brought himself in for worsening anxiety.    The patient presents with worsening anxiety in the context of increased responsibility in caring for his ailing mother and his feeling of inadequacy in not being able to meet expectations. The patient has outpatient treatment with a psychiatrist and therapist who he sees on a regular basis and has a good relationship with. He is compliant with his medications. He agreed to following up with his outpatient psychiatrist to consider making adjustments to his medications to address his mounting anxiety and to work with his therapist to develop better coping strategies. Collateral from patient's brother not concerning for any acute safety risks. The patient denies any SI, HI, AVH, or PI. There are no psychiatric barriers to discharge.    Plan:  -- treat and release  -- continue home medications and outpatient follow up: Dr. Duffy psychiatrist (sees him every 7-8 weeks) - next appointment 9/13/2023, Christen therapist (sees her weekly) - next appointment on 8/29/2023

## 2023-10-03 NOTE — ED PROVIDER NOTE - WET READ LAUNCH FT
Per pharmacy patient has refills remaining. Patient to check with pharmacy for further refills.
There are no Wet Read(s) to document.

## 2023-10-31 NOTE — ED PROVIDER NOTE - DISCUSSED CASE WITH MULTISELECT OPTIONS
Patient BF LM stating patient has had heavier than usual vaginal discharge. He stated that she has soaked one pair of underwear and on her second pair. He stated it was a sticky white vaginal discharge. Attempted to contact patient to discuss her concerns. LMVM for patient to call the office. No other details given.  [email or web address]
Patient called me back. She stated there is an odor to her discharge. Patient reports good FM, no cxt, no LF, no VB. Patient advised that if she experiences big gush of fluid, VB like a period, baby not moving then she will need to go to hospital. Patient was scheduled for tomorrow morning at 8:45 am. Patient voiced understanding.  [email or web address]
Consultant

## 2023-12-24 ENCOUNTER — EMERGENCY (EMERGENCY)
Facility: HOSPITAL | Age: 58
LOS: 1 days | Discharge: HOME | End: 2023-12-24
Attending: EMERGENCY MEDICINE
Payer: MEDICARE

## 2023-12-24 VITALS
WEIGHT: 179.02 LBS | HEIGHT: 70 IN | OXYGEN SATURATION: 100 % | RESPIRATION RATE: 20 BRPM | TEMPERATURE: 99 F | DIASTOLIC BLOOD PRESSURE: 107 MMHG | SYSTOLIC BLOOD PRESSURE: 167 MMHG | HEART RATE: 104 BPM

## 2023-12-24 VITALS
RESPIRATION RATE: 18 BRPM | SYSTOLIC BLOOD PRESSURE: 165 MMHG | TEMPERATURE: 98 F | DIASTOLIC BLOOD PRESSURE: 111 MMHG | HEART RATE: 91 BPM | OXYGEN SATURATION: 98 %

## 2023-12-24 DIAGNOSIS — F42.9 OBSESSIVE-COMPULSIVE DISORDER, UNSPECIFIED: ICD-10-CM

## 2023-12-24 LAB
ALBUMIN SERPL ELPH-MCNC: 5.6 G/DL — HIGH (ref 3.3–5)
ALBUMIN SERPL ELPH-MCNC: 5.6 G/DL — HIGH (ref 3.3–5)
ALP SERPL-CCNC: 71 U/L — SIGNIFICANT CHANGE UP (ref 40–120)
ALP SERPL-CCNC: 71 U/L — SIGNIFICANT CHANGE UP (ref 40–120)
ALT FLD-CCNC: 25 U/L — SIGNIFICANT CHANGE UP (ref 10–45)
ALT FLD-CCNC: 25 U/L — SIGNIFICANT CHANGE UP (ref 10–45)
AMPHET UR-MCNC: NEGATIVE — SIGNIFICANT CHANGE UP
AMPHET UR-MCNC: NEGATIVE — SIGNIFICANT CHANGE UP
ANION GAP SERPL CALC-SCNC: 14 MMOL/L — SIGNIFICANT CHANGE UP (ref 5–17)
ANION GAP SERPL CALC-SCNC: 14 MMOL/L — SIGNIFICANT CHANGE UP (ref 5–17)
APAP SERPL-MCNC: <15 UG/ML — SIGNIFICANT CHANGE UP (ref 10–30)
APAP SERPL-MCNC: <15 UG/ML — SIGNIFICANT CHANGE UP (ref 10–30)
APPEARANCE UR: CLEAR — SIGNIFICANT CHANGE UP
APPEARANCE UR: CLEAR — SIGNIFICANT CHANGE UP
AST SERPL-CCNC: 22 U/L — SIGNIFICANT CHANGE UP (ref 10–40)
AST SERPL-CCNC: 22 U/L — SIGNIFICANT CHANGE UP (ref 10–40)
BARBITURATES UR SCN-MCNC: NEGATIVE — SIGNIFICANT CHANGE UP
BARBITURATES UR SCN-MCNC: NEGATIVE — SIGNIFICANT CHANGE UP
BASOPHILS # BLD AUTO: 0.04 K/UL — SIGNIFICANT CHANGE UP (ref 0–0.2)
BASOPHILS # BLD AUTO: 0.04 K/UL — SIGNIFICANT CHANGE UP (ref 0–0.2)
BASOPHILS NFR BLD AUTO: 0.6 % — SIGNIFICANT CHANGE UP (ref 0–2)
BASOPHILS NFR BLD AUTO: 0.6 % — SIGNIFICANT CHANGE UP (ref 0–2)
BENZODIAZ UR-MCNC: NEGATIVE — SIGNIFICANT CHANGE UP
BENZODIAZ UR-MCNC: NEGATIVE — SIGNIFICANT CHANGE UP
BILIRUB SERPL-MCNC: 0.7 MG/DL — SIGNIFICANT CHANGE UP (ref 0.2–1.2)
BILIRUB SERPL-MCNC: 0.7 MG/DL — SIGNIFICANT CHANGE UP (ref 0.2–1.2)
BILIRUB UR-MCNC: NEGATIVE — SIGNIFICANT CHANGE UP
BILIRUB UR-MCNC: NEGATIVE — SIGNIFICANT CHANGE UP
BUN SERPL-MCNC: 13 MG/DL — SIGNIFICANT CHANGE UP (ref 7–23)
BUN SERPL-MCNC: 13 MG/DL — SIGNIFICANT CHANGE UP (ref 7–23)
CALCIUM SERPL-MCNC: 10.2 MG/DL — SIGNIFICANT CHANGE UP (ref 8.4–10.5)
CALCIUM SERPL-MCNC: 10.2 MG/DL — SIGNIFICANT CHANGE UP (ref 8.4–10.5)
CHLORIDE SERPL-SCNC: 103 MMOL/L — SIGNIFICANT CHANGE UP (ref 96–108)
CHLORIDE SERPL-SCNC: 103 MMOL/L — SIGNIFICANT CHANGE UP (ref 96–108)
CO2 SERPL-SCNC: 24 MMOL/L — SIGNIFICANT CHANGE UP (ref 22–31)
CO2 SERPL-SCNC: 24 MMOL/L — SIGNIFICANT CHANGE UP (ref 22–31)
COCAINE METAB.OTHER UR-MCNC: NEGATIVE — SIGNIFICANT CHANGE UP
COCAINE METAB.OTHER UR-MCNC: NEGATIVE — SIGNIFICANT CHANGE UP
COLOR SPEC: YELLOW — SIGNIFICANT CHANGE UP
COLOR SPEC: YELLOW — SIGNIFICANT CHANGE UP
CREAT SERPL-MCNC: 1.03 MG/DL — SIGNIFICANT CHANGE UP (ref 0.5–1.3)
CREAT SERPL-MCNC: 1.03 MG/DL — SIGNIFICANT CHANGE UP (ref 0.5–1.3)
DIFF PNL FLD: NEGATIVE — SIGNIFICANT CHANGE UP
DIFF PNL FLD: NEGATIVE — SIGNIFICANT CHANGE UP
EGFR: 84 ML/MIN/1.73M2 — SIGNIFICANT CHANGE UP
EGFR: 84 ML/MIN/1.73M2 — SIGNIFICANT CHANGE UP
EOSINOPHIL # BLD AUTO: 0.05 K/UL — SIGNIFICANT CHANGE UP (ref 0–0.5)
EOSINOPHIL # BLD AUTO: 0.05 K/UL — SIGNIFICANT CHANGE UP (ref 0–0.5)
EOSINOPHIL NFR BLD AUTO: 0.7 % — SIGNIFICANT CHANGE UP (ref 0–6)
EOSINOPHIL NFR BLD AUTO: 0.7 % — SIGNIFICANT CHANGE UP (ref 0–6)
ETHANOL SERPL-MCNC: <10 MG/DL — SIGNIFICANT CHANGE UP (ref 0–10)
ETHANOL SERPL-MCNC: <10 MG/DL — SIGNIFICANT CHANGE UP (ref 0–10)
FLUAV AG NPH QL: SIGNIFICANT CHANGE UP
FLUAV AG NPH QL: SIGNIFICANT CHANGE UP
FLUBV AG NPH QL: SIGNIFICANT CHANGE UP
FLUBV AG NPH QL: SIGNIFICANT CHANGE UP
GLUCOSE SERPL-MCNC: 98 MG/DL — SIGNIFICANT CHANGE UP (ref 70–99)
GLUCOSE SERPL-MCNC: 98 MG/DL — SIGNIFICANT CHANGE UP (ref 70–99)
GLUCOSE UR QL: NEGATIVE MG/DL — SIGNIFICANT CHANGE UP
GLUCOSE UR QL: NEGATIVE MG/DL — SIGNIFICANT CHANGE UP
HCT VFR BLD CALC: 47.1 % — SIGNIFICANT CHANGE UP (ref 39–50)
HCT VFR BLD CALC: 47.1 % — SIGNIFICANT CHANGE UP (ref 39–50)
HGB BLD-MCNC: 16.4 G/DL — SIGNIFICANT CHANGE UP (ref 13–17)
HGB BLD-MCNC: 16.4 G/DL — SIGNIFICANT CHANGE UP (ref 13–17)
IMM GRANULOCYTES NFR BLD AUTO: 0.6 % — SIGNIFICANT CHANGE UP (ref 0–0.9)
IMM GRANULOCYTES NFR BLD AUTO: 0.6 % — SIGNIFICANT CHANGE UP (ref 0–0.9)
KETONES UR-MCNC: NEGATIVE MG/DL — SIGNIFICANT CHANGE UP
KETONES UR-MCNC: NEGATIVE MG/DL — SIGNIFICANT CHANGE UP
LEUKOCYTE ESTERASE UR-ACNC: NEGATIVE — SIGNIFICANT CHANGE UP
LEUKOCYTE ESTERASE UR-ACNC: NEGATIVE — SIGNIFICANT CHANGE UP
LYMPHOCYTES # BLD AUTO: 1.72 K/UL — SIGNIFICANT CHANGE UP (ref 1–3.3)
LYMPHOCYTES # BLD AUTO: 1.72 K/UL — SIGNIFICANT CHANGE UP (ref 1–3.3)
LYMPHOCYTES # BLD AUTO: 23.8 % — SIGNIFICANT CHANGE UP (ref 13–44)
LYMPHOCYTES # BLD AUTO: 23.8 % — SIGNIFICANT CHANGE UP (ref 13–44)
MCHC RBC-ENTMCNC: 34.5 PG — HIGH (ref 27–34)
MCHC RBC-ENTMCNC: 34.5 PG — HIGH (ref 27–34)
MCHC RBC-ENTMCNC: 34.8 GM/DL — SIGNIFICANT CHANGE UP (ref 32–36)
MCHC RBC-ENTMCNC: 34.8 GM/DL — SIGNIFICANT CHANGE UP (ref 32–36)
MCV RBC AUTO: 98.9 FL — SIGNIFICANT CHANGE UP (ref 80–100)
MCV RBC AUTO: 98.9 FL — SIGNIFICANT CHANGE UP (ref 80–100)
METHADONE UR-MCNC: NEGATIVE — SIGNIFICANT CHANGE UP
METHADONE UR-MCNC: NEGATIVE — SIGNIFICANT CHANGE UP
MONOCYTES # BLD AUTO: 0.56 K/UL — SIGNIFICANT CHANGE UP (ref 0–0.9)
MONOCYTES # BLD AUTO: 0.56 K/UL — SIGNIFICANT CHANGE UP (ref 0–0.9)
MONOCYTES NFR BLD AUTO: 7.8 % — SIGNIFICANT CHANGE UP (ref 2–14)
MONOCYTES NFR BLD AUTO: 7.8 % — SIGNIFICANT CHANGE UP (ref 2–14)
NEUTROPHILS # BLD AUTO: 4.81 K/UL — SIGNIFICANT CHANGE UP (ref 1.8–7.4)
NEUTROPHILS # BLD AUTO: 4.81 K/UL — SIGNIFICANT CHANGE UP (ref 1.8–7.4)
NEUTROPHILS NFR BLD AUTO: 66.5 % — SIGNIFICANT CHANGE UP (ref 43–77)
NEUTROPHILS NFR BLD AUTO: 66.5 % — SIGNIFICANT CHANGE UP (ref 43–77)
NITRITE UR-MCNC: NEGATIVE — SIGNIFICANT CHANGE UP
NITRITE UR-MCNC: NEGATIVE — SIGNIFICANT CHANGE UP
NRBC # BLD: 0 /100 WBCS — SIGNIFICANT CHANGE UP (ref 0–0)
NRBC # BLD: 0 /100 WBCS — SIGNIFICANT CHANGE UP (ref 0–0)
OPIATES UR-MCNC: NEGATIVE — SIGNIFICANT CHANGE UP
OPIATES UR-MCNC: NEGATIVE — SIGNIFICANT CHANGE UP
OXYCODONE UR-MCNC: NEGATIVE — SIGNIFICANT CHANGE UP
OXYCODONE UR-MCNC: NEGATIVE — SIGNIFICANT CHANGE UP
PCP SPEC-MCNC: SIGNIFICANT CHANGE UP
PCP SPEC-MCNC: SIGNIFICANT CHANGE UP
PCP UR-MCNC: NEGATIVE — SIGNIFICANT CHANGE UP
PCP UR-MCNC: NEGATIVE — SIGNIFICANT CHANGE UP
PH UR: 7.5 — SIGNIFICANT CHANGE UP (ref 5–8)
PH UR: 7.5 — SIGNIFICANT CHANGE UP (ref 5–8)
PLATELET # BLD AUTO: 176 K/UL — SIGNIFICANT CHANGE UP (ref 150–400)
PLATELET # BLD AUTO: 176 K/UL — SIGNIFICANT CHANGE UP (ref 150–400)
POTASSIUM SERPL-MCNC: 4.2 MMOL/L — SIGNIFICANT CHANGE UP (ref 3.5–5.3)
POTASSIUM SERPL-MCNC: 4.2 MMOL/L — SIGNIFICANT CHANGE UP (ref 3.5–5.3)
POTASSIUM SERPL-SCNC: 4.2 MMOL/L — SIGNIFICANT CHANGE UP (ref 3.5–5.3)
POTASSIUM SERPL-SCNC: 4.2 MMOL/L — SIGNIFICANT CHANGE UP (ref 3.5–5.3)
PROT SERPL-MCNC: 7.5 G/DL — SIGNIFICANT CHANGE UP (ref 6–8.3)
PROT SERPL-MCNC: 7.5 G/DL — SIGNIFICANT CHANGE UP (ref 6–8.3)
PROT UR-MCNC: NEGATIVE MG/DL — SIGNIFICANT CHANGE UP
PROT UR-MCNC: NEGATIVE MG/DL — SIGNIFICANT CHANGE UP
RBC # BLD: 4.76 M/UL — SIGNIFICANT CHANGE UP (ref 4.2–5.8)
RBC # BLD: 4.76 M/UL — SIGNIFICANT CHANGE UP (ref 4.2–5.8)
RBC # FLD: 12.3 % — SIGNIFICANT CHANGE UP (ref 10.3–14.5)
RBC # FLD: 12.3 % — SIGNIFICANT CHANGE UP (ref 10.3–14.5)
RSV RNA NPH QL NAA+NON-PROBE: SIGNIFICANT CHANGE UP
RSV RNA NPH QL NAA+NON-PROBE: SIGNIFICANT CHANGE UP
SALICYLATES SERPL-MCNC: <2 MG/DL — LOW (ref 15–30)
SALICYLATES SERPL-MCNC: <2 MG/DL — LOW (ref 15–30)
SARS-COV-2 RNA SPEC QL NAA+PROBE: SIGNIFICANT CHANGE UP
SARS-COV-2 RNA SPEC QL NAA+PROBE: SIGNIFICANT CHANGE UP
SODIUM SERPL-SCNC: 141 MMOL/L — SIGNIFICANT CHANGE UP (ref 135–145)
SODIUM SERPL-SCNC: 141 MMOL/L — SIGNIFICANT CHANGE UP (ref 135–145)
SP GR SPEC: <1.005 — LOW (ref 1–1.03)
SP GR SPEC: <1.005 — LOW (ref 1–1.03)
THC UR QL: NEGATIVE — SIGNIFICANT CHANGE UP
THC UR QL: NEGATIVE — SIGNIFICANT CHANGE UP
UROBILINOGEN FLD QL: 0.2 MG/DL — SIGNIFICANT CHANGE UP (ref 0.2–1)
UROBILINOGEN FLD QL: 0.2 MG/DL — SIGNIFICANT CHANGE UP (ref 0.2–1)
WBC # BLD: 7.22 K/UL — SIGNIFICANT CHANGE UP (ref 3.8–10.5)
WBC # BLD: 7.22 K/UL — SIGNIFICANT CHANGE UP (ref 3.8–10.5)
WBC # FLD AUTO: 7.22 K/UL — SIGNIFICANT CHANGE UP (ref 3.8–10.5)
WBC # FLD AUTO: 7.22 K/UL — SIGNIFICANT CHANGE UP (ref 3.8–10.5)

## 2023-12-24 PROCEDURE — 81003 URINALYSIS AUTO W/O SCOPE: CPT

## 2023-12-24 PROCEDURE — 99285 EMERGENCY DEPT VISIT HI MDM: CPT | Mod: 25

## 2023-12-24 PROCEDURE — 85025 COMPLETE CBC W/AUTO DIFF WBC: CPT

## 2023-12-24 PROCEDURE — 80053 COMPREHEN METABOLIC PANEL: CPT

## 2023-12-24 PROCEDURE — 87637 SARSCOV2&INF A&B&RSV AMP PRB: CPT

## 2023-12-24 PROCEDURE — 90792 PSYCH DIAG EVAL W/MED SRVCS: CPT | Mod: 95

## 2023-12-24 PROCEDURE — 93005 ELECTROCARDIOGRAM TRACING: CPT

## 2023-12-24 PROCEDURE — 80307 DRUG TEST PRSMV CHEM ANLYZR: CPT

## 2023-12-24 PROCEDURE — 99285 EMERGENCY DEPT VISIT HI MDM: CPT

## 2023-12-24 NOTE — ED BEHAVIORAL HEALTH ASSESSMENT NOTE - NS ED BHA TELEPSYCH PROVIDER LOCATION
[address] [facility], [place], [place] [address] [facility], [place], [place]

## 2023-12-24 NOTE — ED BEHAVIORAL HEALTH ASSESSMENT NOTE - SUMMARY
done
Overall pt's current presentation is most c/w his chronic anxiety and cognitive rigidity related to his ASD/OCD and difficulty tolerating a change in his expectations.  He reported transient fleeting passive SI w/o plan/intent however currently is euthymic, denies SI/HI, is future oriented, treatment seeking, and has a robust outpatient team.  Moreover // is w/o specific acute safety concerns.  Psychiatrically appropriate for d/c.

## 2023-12-24 NOTE — ED PROVIDER NOTE - PATIENT PORTAL LINK FT
You can access the FollowMyHealth Patient Portal offered by [facility] by registering at the following website: [email or web address]. By joining [employer]’s FollowMyHealth portal, you will also be able to view your health information using other applications (apps) compatible with our system. You can access the FollowMyHealth Patient Portal offered by [facility] by registering at the following website: [email or web address]. By joining [employer]’s FollowMyHealth portal, you will also be able to view your health information using other applications (apps) compatible with our system.

## 2023-12-24 NOTE — ED ADULT NURSE NOTE - NSFALLUNIVINTERV_ED_ALL_ED
Bed/Stretcher in lowest position, wheels locked, appropriate side rails in place/Call bell, personal items and telephone in reach/Instruct patient to call for assistance before getting out of bed/chair/stretcher/Non-slip footwear applied when patient is off stretcher/[place] to call system/Physically safe environment - no spills, clutter or unnecessary equipment/Purposeful proactive rounding/Room/bathroom lighting operational, light cord in reach Bed/Stretcher in lowest position, wheels locked, appropriate side rails in place/Call bell, personal items and telephone in reach/Instruct patient to call for assistance before getting out of bed/chair/stretcher/Non-slip footwear applied when patient is off stretcher/[place] to call system/Physically safe environment - no spills, clutter or unnecessary equipment/Purposeful proactive rounding/Room/bathroom lighting operational, light cord in reach

## 2023-12-24 NOTE — ED BEHAVIORAL HEALTH ASSESSMENT NOTE - RISK ASSESSMENT
Patient has chronic risk factors including h/o ADHD and h/o NSSI.  Acute risk mitigated by denial of SI/HI, future orientation, and is further mitigated on this presentation by referral back to outpt provider.  Psych appropriate for d/c.

## 2023-12-24 NOTE — ED ADULT NURSE NOTE - OBJECTIVE STATEMENT
58 year old male BIB self with SI; A&O; +SI, no plan or intent at this time; denies AVH but states "I get visual headaches"; denies ETOH and drug use; denies Axis III. This is an anxious and loud but cooperative pt, wadning done and clothes and valuables taken, CO begun. Pt is well known to this writer by two via visits to ED with very similar presentation, he has a HX of Asperger's and takes Seroquel, Klonopin and other meds and is complaint, today he states "My mother was going to be admitted for a UTI and I thought I would get a break from caring for her but they didn't. She needs a 24/7 aide and we may not be eligible for that and that is making me anxious. She is 99 and I think I need to be admitted to the hospital and I am overwhelmed"; pt's brother Justo called ; continue to monitor. 58 year old male BIB self with SI; A&O; +SI, no plan or intent at this time; denies AVH but states "I get visual headaches"; denies ETOH and drug use; denies Axis III. This is an anxious and loud but cooperative pt, wanding done and clothes and valuables taken, CO begun. Pt is well known to this writer by two prior visits to ED with very similar presentation, he has a HX of Asperger's OCD and takes Seroquel, Klonopin and other meds and is complaint, today he states "My mother was going to be admitted for a UTI and I thought I would get a break from caring for her but they didn't. She needs a 24/7 aide and we may not be eligible for that and that is making me anxious. She is 99 years old. I think I need to be admitted to the hospital I am overwhelmed caring for her"; pt's brother Jan called ; continue to monitor.

## 2023-12-24 NOTE — ED BEHAVIORAL HEALTH ASSESSMENT NOTE - NSBHTIMEBASEDBILLING_PSY_A_CORE
55 y/o F presents with Bell's palsy. Will discharge home with Prednisone and lubricating eye drop.
yes...

## 2023-12-24 NOTE — ED BEHAVIORAL HEALTH NOTE - BEHAVIORAL HEALTH NOTE
========================     FOR EACH COLLATERAL     ========================     NAME: Hever     NUMBER:       RELATIONSHIP: Brother      RELIABILITY: Reliable historian.     COMMENTS: [facility] contacted collateral to assess patients baseline behavior and to learn background information the team can use to better assess the patient.     ( ) Yes     ( x ) No     Rationale for overriding objection     ( ) Lack of capacity. Details: ________     ( x) Assessing risk of danger to self/others. Details: ________     Rationale for selecting specific collateral source     ( x) Potential to impact risk of danger to self/others and no alternative equivalent. Details:     Collateral has requested that the information provided remain confidential: Yes [ ] No [ x ]     Collateral has provided information that patient is/may be unaware of: Yes [ ] No [ x ]     ========================     PATIENT DEMOGRAPHICS:     ========================     HPI     BASELINE FUNCTIONING: At baseline, the patient is a 58-year-old man, domiciled with his 98-year-old mother. The patient is reported to have an extensive history of autism and other psychiatric related disorders. According to collateral, the patient has poor adjustment skills and has recently been experiencing depression due to needing to take care of their mother when the home health aide is not available. Reportedly the collateral, is working on getting more hours from their home health aide agency but expressed that it takes time for such request to be completed and thus, offered to stay with their mom to assist.      DATE HPI STARTED: 12/24/23     DECOMPENSATION: Collateral reported that due to stress of needing to take care of their mom and the reported depression the patient brought themselves into the ED.     SUICIDALITY: Reported that patient called hotline once recently but did not elaborate further.      VIOLENCE: None Reported     SUBSTANCE: None reported     ========================     PAST PSYCHIATRIC HISTORY     ========================     DATE PAST PSYCHIATRIC HISTORY STARTED: Ongoing     MAIN PSYCHIATRIC DIAGNOSIS: Autism and depression     PSYCHIATRIC HOSPITALIZATIONS: Multiple this years.      PRIOR ILLNESS: None reported     SUICIDALITY: None reported     VIOLENCE: None reported.      SUBSTANCE USE: None reported      ==============     OTHER HISTORY     ==============     CURRENT MEDICATION: Unknown     MEDICAL HISTORY: None reported     ALLERGIES:  None reported     LEGAL ISSUES: None reported      FIREARM ACCESS: None reported.      SOCIAL HISTORY: None reported     FAMILY HISTORY: None reported      DEVELOPMENTAL HISTORY: None reported. ========================     FOR EACH COLLATERAL     ========================     NAME: Hever     NUMBER:       RELATIONSHIP: Brother      RELIABILITY: Reliable historian.     COMMENTS: [facility] contacted collateral to assess patients baseline behavior and to learn background information the team can use to better assess the patient.     ( ) Yes     ( x ) No     Rationale for overriding objection     ( ) Lack of capacity. Details: ________     ( x) Assessing risk of danger to self/others. Details: ________     Rationale for selecting specific collateral source     ( x) Potential to impact risk of danger to self/others and no alternative equivalent. Details:     Collateral has requested that the information provided remain confidential: Yes [ ] No [ x ]     Collateral has provided information that patient is/may be unaware of: Yes [ ] No [ x ]     ========================     PATIENT DEMOGRAPHICS:     ========================     HPI     BASELINE FUNCTIONING: At baseline, the patient is a 58-year-old man, domiciled with his 98-year-old mother. The patient is reported to have an extensive history of autism and other psychiatric related disorders. According to collateral, the patient has poor adjustment skills and has recently been experiencing depression due to needing to take care of their mother when the home health aide is not available. Reportedly the collateral, is working on getting more hours from their home health aide agency but expressed that it takes time for such request to be completed and thus, offered to stay with their mom to assist.      DATE HPI STARTED: 12/24/23     DECOMPENSATION: Collateral reported that due to stress of needing to take care of their mom and the reported depression the patient brought themselves into the ED.     SUICIDALITY: Reported that patient called hotline once recently but did not elaborate further.      VIOLENCE: None Reported     SUBSTANCE: None reported     ========================     PAST PSYCHIATRIC HISTORY     ========================     DATE PAST PSYCHIATRIC HISTORY STARTED: Ongoing     MAIN PSYCHIATRIC DIAGNOSIS: Autism and depression     PSYCHIATRIC HOSPITALIZATIONS: Multiple this years.      PRIOR ILLNESS: None reported     SUICIDALITY: None reported     VIOLENCE: None reported.      SUBSTANCE USE: None reported      ==============     OTHER HISTORY     ==============     CURRENT MEDICATION: Unknown     MEDICAL HISTORY: None reported     ALLERGIES:  None reported     LEGAL ISSUES: None reported      FIREARM ACCESS: None reported.      SOCIAL HISTORY: None reported     FAMILY HISTORY: None reported      DEVELOPMENTAL HISTORY: None reported.

## 2023-12-24 NOTE — ED PROVIDER NOTE - OBJECTIVE STATEMENT
Attending note.  Patient was seen in room #29.  Patient drove himself to the emergency department to be evaluated.  Patient states he is having difficulty and feeling overwhelmed with thoughts of having to take care of his 98-year-old mother every day.  Patient was previously seen in August of this year twice for same complaints.  The patient's mother has 4 hours of home health aide daily.  He has a past medical history of OCD, anxiety, schizophrenia, Asperger's.  He is currently taking Seroquel, Klonopin, Cymbalta, trazodone.  He reports being compliant with medications.  He denies any suicidal ideation.  He keeps saying "what is to become of me".  Patient lives with his mother and his brother lives close by.

## 2023-12-24 NOTE — ED PROVIDER NOTE - NSFOLLOWUPINSTRUCTIONS_ED_ALL_ED_FT
Continue current medication and dosages as prescribed by your psychiatrist.  Follow-up with your psychiatrist as currently scheduled.

## 2023-12-24 NOTE — ED BEHAVIORAL HEALTH ASSESSMENT NOTE - HPI (INCLUDE ILLNESS QUALITY, SEVERITY, DURATION, TIMING, CONTEXT, MODIFYING FACTORS, ASSOCIATED SIGNS AND SYMPTOMS)
The patient is a 58-year-old man, single, unemployed, lives with mother, with no significant PMH and with PPH of ASD, ADHD, OCD, 3 prior hospitalizations (2008, 1990s, 1980s), no prior SA, history of NSSI at age 20, no legal history, no substance abuse, in outpatient treatment with Dr. Duffy (psychiatrist) and Christen (therapist), who brought himself in for worsening anxiety.    Patient w several similar presentations, increased anxiety related to taking care of his elderly mom, most recently in 8/2023 and d/c'd.    On this presentation he states that earlier today he found out that his mom was denied a 24/7 aid which he found very distressing.  He states he's not sure what he will do bc her health has been worsening.  He reports when hearing the news he briefly had passive SI w/o any specific plan/intent though denies SI currently and states that it would be "extremely unlikely" that he would do something to try to harm himself.  He reports PF of his family and supportive therapeutic relationships.  We discuss that there may be a middle ground b/w 4 hours (current amount of time) and 24/7 and that his brother said he would be able to help w/ adult care.  He states he called a suicide hotline a month or two ago to have a "therapeutic ear" but denies having a suicidal plan/intent when calling.      On psych ROS he reports anxious mood, notes some trouble sleeping and is adjusting his clomipramine to help w this w his psychiatrist, notes normal energy level/appetite, denies SI/HI/AVH/PI/gun access/substance use.  States he has a psychiatry appt 1/8 and a therapy appt 1/2 and spoke w his therapist yesterday which was helpful.  States he feels comfortable and safe to return home.    SW called his brother, see  note for more details.  Brother states that he's able to help out w caring for their mother and has no known acute safety concerns.

## 2023-12-24 NOTE — ED PROVIDER NOTE - CLINICAL SUMMARY MEDICAL DECISION MAKING FREE TEXT BOX
Attending note.  Patient drove himself to the emergency department to be evaluated.  He is feeling "overwhelmed" and keeps repeating "what is to be, for me" patient lives with his mother who is 98 years old.  Home health aide comes in 4 hours a day and patient is concerned they will need more help.  He denies any suicidal or homicidal ideation.  He does report anxiety as well.  He denies any other significant somatic symptoms.  Labs, psychiatry consult.

## 2023-12-24 NOTE — ED BEHAVIORAL HEALTH ASSESSMENT NOTE - NS ED BHA SUICIDALITY PRESENT CURRENT PASSIVE IDEATION
Thank you for let me take care of problem, I will see you back in 1 month, no lifting straining pulling tugging until the mid part of August, keep that dressing on the round 1 around the [facility] until it becomes not water occlusive, no swimming pools bathtubs lakes etc. for the next 3 to 4 weeks thanks .  
Yes

## 2023-12-24 NOTE — ED PROVIDER NOTE - PROGRESS NOTE DETAILS
Discussed with telepsych and psychiatrist.  Patient is stable for discharge.  He has an appointment with his psychiatrist on January 6.  No change in current medications or dosages.

## 2023-12-24 NOTE — ED BEHAVIORAL HEALTH ASSESSMENT NOTE - NSBHATTESTBILLING_PSY_A_CORE
[id number] diagnostic evaluation with medical services [id number] diagnostic evaluation with medical services

## 2023-12-24 NOTE — ED BEHAVIORAL HEALTH NOTE - BEHAVIORAL HEALTH NOTE
===================    PRE-HOSPITAL COURSE    ==================    SOURCE:  ED provider and ED documentation     DETAILS:  Pt BiBself for worsening SI.      ============    ED COURSE    ============    SOURCE: ED provider and secondhand ED documentation.    ARRIVAL: Per ED documentation patient BiBself to ED. Patient cooperative with triage process.     BELONGINGS: Unknown   BEHAVIOR: ED provider described patient to be calm, remains in behavioral and impulse control, and is not in restraints. Pt currently has 1:1 sitter.  Pt is not displaying aggression towards staff or others and was described as cooperative. Per provider, pt presenting with anxious affect and mood is congruent with affect. Pt has a linear thought process and able to answer questions appropriately. Provider stated that the patient is endorsing current SI with no plan. No HI. Per provider pt not endorsing A/VH.  There are no visible marks, bruises, or lacerations on the body. Provider reports that the patient appears to have poor hygiene.   TREATMENT: No medication administered. No restraints.     VISITORS: None     -----------------------------------------------   COVID Exposure Screen- collateral (i.e. third-party, chart review, belongings, etc; include EMS and ED staff)   ---------------------------------------------------   1. Has the patient had a COVID-19 test in the last 90 days? Unknown.   2. Has the patient tested positive for COVID-19 antibodies? Unknown.   3.Has the patient received 2 doses of the COVID-19 vaccine?  Unknown.   4. In the past 10 days, has the patient been around anyone with a positive COVID-19 test?* Unknown.   5.Has the patient been out of [place] within the past 10 days? Unknown.

## 2023-12-25 PROBLEM — F41.9 ANXIETY DISORDER, UNSPECIFIED: Chronic | Status: ACTIVE | Noted: 2023-08-25

## 2023-12-25 PROBLEM — F42.9 OBSESSIVE-COMPULSIVE DISORDER, UNSPECIFIED: Chronic | Status: ACTIVE | Noted: 2023-08-25

## 2023-12-25 PROBLEM — F84.0 AUTISTIC DISORDER: Chronic | Status: ACTIVE | Noted: 2023-08-25

## 2023-12-28 ENCOUNTER — EMERGENCY (EMERGENCY)
Facility: HOSPITAL | Age: 58
LOS: 1 days | Discharge: HOME | End: 2023-12-28
Admitting: EMERGENCY MEDICINE
Payer: MEDICARE

## 2023-12-28 VITALS
HEART RATE: 82 BPM | SYSTOLIC BLOOD PRESSURE: 132 MMHG | RESPIRATION RATE: 18 BRPM | DIASTOLIC BLOOD PRESSURE: 77 MMHG | TEMPERATURE: 98 F | HEIGHT: 70 IN | OXYGEN SATURATION: 100 %

## 2023-12-28 PROCEDURE — 99284 EMERGENCY DEPT VISIT MOD MDM: CPT

## 2023-12-28 NOTE — ED ADULT NURSE NOTE - OBJECTIVE STATEMENT
Hx: Autism, Anxiety. Pt from home activated EMS endorsing anxiety/paranoia due to the fact his mom who is usually home with himn is in the hospital and potentially dying. Pt is anxious/worried about the pressures and struggles of taking care of himself, but also states it may be a "blessing" if she passes so that is less to worry about. Pt appears anxious, yet well. Cooperative, NAD, resp equal and unlabored. Pt also complaints of optical migraines which he had today and worry him. Denies; S/I, SIB, H/I, insomnia/hypersomnia, isolation, ETOH/drug use.

## 2023-12-28 NOTE — ED PROVIDER NOTE - NSFOLLOWUPINSTRUCTIONS_ED_ALL_ED_FT
Follow up with your PMD within 48-72 hrs. Show copies of your reports given to you.   Worsening, continued or new concerning symptoms return to the emergency department.    You have been given information necessary to follow up with the  [facility] ([facility]) Crisis center & other outpatient  psychiatric clinics within your community    • [facility] walk in Crisis centre  [address] [place] [address]  [phone] [email or web address]  Hours of operation:  Day	                                        Hours  Sunday                                  Closed  Monday                                9am - 3pm  Tuesday                                9am - 3pm  Wednesday                          9am - 3pm  Thursday                               9am - 3pm  Friday                                    9am - 3pm  Saturday                                Closed Follow up with your PMD within 48-72 hrs. Show copies of your reports given to you.   Worsening, continued or new concerning symptoms return to the emergency department.    You have been given information necessary to follow up with the  [facility] ([facility]) Crisis center & other outpatient  psychiatric clinics within your community    • [facility] walk in Crisis centre  [address] [place] [address]  [phone] [email or web address]  Hours of operation:  Day	                                        Hours  Sunday                                  Closed  Monday                                9am - 3pm  Tuesday                                9am - 3pm  Wednesday                          9am - 3pm  Thursday                               9am - 3pm  Friday                                    9am - 3pm  Saturday                                Closed

## 2023-12-28 NOTE — ED PROVIDER NOTE - OBJECTIVE STATEMENT
57 yo M PMH Autism, OCD, MDD p/w increased anxiety and depression and SI with no plan since his mother has been in hospice and having 10x hospitalizations since April 2021. Mother is 98 years old reportedly and very sick. Initially having difficulty speaking but resolved prior to EMS arrival. Reports compliance with klonopin, cymbalta, seroquel. No prior attempts of suicide. Brother on his way to the hospital to receive patient. Patient denies fever, headache, dizziness, SI/HI/AH/VH, chills, chest pain, shortness of breath, abdominal pain, sick contact or recent travel. Denies alcohol use or other drugs.

## 2023-12-28 NOTE — ED ADULT TRIAGE NOTE - CHIEF COMPLAINT QUOTE
Pt st" I had trouble talking today around 6pm....it comes and goes was worse few days ago." Pt talking in complete sentences. " I feel anxiety my Mother is in hospital....I feel depressed. I feel like I wont ever do it but I never tried to hurt myself." Denies drug alcholol use.

## 2023-12-28 NOTE — ED BEHAVIORAL HEALTH NOTE - BEHAVIORAL HEALTH NOTE
Writer contacted pt’s Hever [phone] to obtain collateral information. The following information is per the mother.    Pt is a 59 yo male domiciled w/mother (97 yo), hx of autism, suffers from anxiety and panic attacks, not working, bib ems.    Reason for ed visit: Brother says he found ot from neighbor that pt called EMS. He says they had a conversation about mother’s care which triggered his anxiety.    Symptoms/hx:  last Sunday had panic attack and went to [facility] after pt calmed down.  Pt has a hx of panic attacks as per brother. Brother says pt has a hx of Si and has called suicide hotline in the past for support. He denied ay past attempts and says pt would never have any intention. He denied any AVH, paranoia or delusions. He says pt is sleeping eating and showering at baseline.    Baseline: at baseline he struggles with anxiety. He says problems are magnified for the pt due to autism. He says pt cares for himself.    Stressors: mother is in the hospital. Process of getting aid workers to help with mother. spoke w/ pt today about moving mom to nursing home.  Mother has gastro virus and suggested pt to clean the table and things today.    Treatment team: pt has psychiatrist dr anna Eaton and therapist wes in [place], regularly speaks with therapist. Therapist currently on vacation.    Medication: klonopin and others? He did not know full list. Compliant w/ medication.    Medical problems: none     Drug/alcohol use: none reported.    Violence/aggression: he says pt is not violent or aggressive.    Dispo: Brother will pick the pt up. Writer contacted pt’s Hever [phone] to obtain collateral information. The following information is per the mother.    Pt is a 57 yo male domiciled w/mother (99 yo), hx of autism, suffers from anxiety and panic attacks, not working, bib ems.    Reason for ed visit: Brother says he found ot from neighbor that pt called EMS. He says they had a conversation about mother’s care which triggered his anxiety.    Symptoms/hx:  last Sunday had panic attack and went to [facility] after pt calmed down.  Pt has a hx of panic attacks as per brother. Brother says pt has a hx of Si and has called suicide hotline in the past for support. He denied ay past attempts and says pt would never have any intention. He denied any AVH, paranoia or delusions. He says pt is sleeping eating and showering at baseline.    Baseline: at baseline he struggles with anxiety. He says problems are magnified for the pt due to autism. He says pt cares for himself.    Stressors: mother is in the hospital. Process of getting aid workers to help with mother. spoke w/ pt today about moving mom to nursing home.  Mother has gastro virus and suggested pt to clean the table and things today.    Treatment team: pt has psychiatrist dr anna Eaton and therapist wes in [place], regularly speaks with therapist. Therapist currently on vacation.    Medication: klonopin and others? He did not know full list. Compliant w/ medication.    Medical problems: none     Drug/alcohol use: none reported.    Violence/aggression: he says pt is not violent or aggressive.    Dispo: Brother will pick the pt up. Writer contacted pt’s Hever [phone] to obtain collateral information. The following information is per the mother.    Pt is a 57 yo male domiciled w/mother (97 yo), hx of autism, suffers from anxiety and panic attacks, not working, bib ems.    Reason for ed visit: Brother says he found ot from neighbor that pt called EMS. He says they had a conversation about mother’s care which triggered his anxiety.    Symptoms/hx:  last Sunday had panic attack and went to [facility] after pt calmed down.  Pt has a hx of panic attacks as per brother. Brother says pt has a hx of Si and has called suicide hotline in the past for support. He denied ay past attempts and says pt would never have any intention. He denied any AVH, paranoia or delusions. He says pt is sleeping eating and showering at baseline.    Baseline: at baseline he struggles with anxiety. He says problems are magnified for the pt due to autism. He says pt cares for himself.    Stressors: mother is in the hospital. Process of getting aid workers to help with mother. spoke w/ pt today about moving mom to nursing home.  Mother has gastro virus and suggested pt to clean the table and things today.    Treatment team: pt has psychiatrist dr anna Eaton and therapist wes in [place], regularly speaks with therapist. Therapist currently on vacation.    Medication: klonopin and others? He did not know full list. Compliant w/ medication.    Medical problems: none     Drug/alcohol use: none reported.    Violence/aggression: he says pt is not violent or aggressive.    Dispo: Brother will pick the pt up.    Writer met with pt at bedside. writer and pt explored coping skills which included listening to music, watching tv and deep breathing exercise. Writer in agreement to utilize these coping skills. he says he is familiar with [facility] crisis hotline if needed. no further resources requested. pt following up with therapist on Tuesday 01/02/2023. Writer contacted pt’s Hever [phone] to obtain collateral information. The following information is per the mother.    Pt is a 57 yo male domiciled w/mother (99 yo), hx of autism, suffers from anxiety and panic attacks, not working, bib ems.    Reason for ed visit: Brother says he found ot from neighbor that pt called EMS. He says they had a conversation about mother’s care which triggered his anxiety.    Symptoms/hx:  last Sunday had panic attack and went to [facility] after pt calmed down.  Pt has a hx of panic attacks as per brother. Brother says pt has a hx of Si and has called suicide hotline in the past for support. He denied ay past attempts and says pt would never have any intention. He denied any AVH, paranoia or delusions. He says pt is sleeping eating and showering at baseline.    Baseline: at baseline he struggles with anxiety. He says problems are magnified for the pt due to autism. He says pt cares for himself.    Stressors: mother is in the hospital. Process of getting aid workers to help with mother. spoke w/ pt today about moving mom to nursing home.  Mother has gastro virus and suggested pt to clean the table and things today.    Treatment team: pt has psychiatrist dr anna Eaton and therapist wes in [place], regularly speaks with therapist. Therapist currently on vacation.    Medication: klonopin and others? He did not know full list. Compliant w/ medication.    Medical problems: none     Drug/alcohol use: none reported.    Violence/aggression: he says pt is not violent or aggressive.    Dispo: Brother will pick the pt up.    Writer met with pt at bedside. writer and pt explored coping skills which included listening to music, watching tv and deep breathing exercise. Writer in agreement to utilize these coping skills. he says he is familiar with [facility] crisis hotline if needed. no further resources requested. pt following up with therapist on Tuesday 01/02/2023.

## 2023-12-28 NOTE — ED PROVIDER NOTE - PATIENT PORTAL LINK FT
You can access the FollowMyHealth Patient Portal offered by [facility] by registering at the following website: [email or web address]. By joining [employer]’s FollowMyHealth portal, you will also be able to view your health information using other applications (apps) compatible with our system. You can access the FollowMyHealth Patient Portal offered by [facility] by registering at the following website: [email or web address]. By joining "[employer]’s FollowMyHealth portal, you will also be able to view your health information using other applications (apps) compatible with our system.

## 2023-12-28 NOTE — ED PROVIDER NOTE - CLINICAL SUMMARY MEDICAL DECISION MAKING FREE TEXT BOX
59 yo M PMH Autism, OCD, MDD p/w increased anxiety and depression and SI with no plan since his mother has been in hospice and having 10x hospitalizations since April 2021. Mother is 98 years old reportedly and very sick. Initially having difficulty speaking but resolved prior to EMS arrival. Reports compliance with klonopin, cymbalta, seroquel. No prior attempts of suicide. Brother on his way to the hospital to receive patient.   SW collateral  Dispo Home 57 yo M PMH Autism, OCD, MDD p/w increased anxiety and depression and SI with no plan since his mother has been in hospice and having 10x hospitalizations since April 2021. Mother is 98 years old reportedly and very sick. Initially having difficulty speaking but resolved prior to EMS arrival. Reports compliance with klonopin, cymbalta, seroquel. No prior attempts of suicide. Brother on his way to the hospital to receive patient.   SW collateral  Dispo Home

## 2023-12-28 NOTE — ED ADULT NURSE NOTE - NSFALLUNIVINTERV_ED_ALL_ED
Bed/Stretcher in lowest position, wheels locked, appropriate side rails in place/Call bell, personal items and telephone in reach/Instruct patient to call for assistance before getting out of bed/chair/stretcher/Non-slip footwear applied when patient is off stretcher/[place] to call system/Physically safe environment - no spills, clutter or unnecessary equipment/Purposeful proactive rounding/Room/bathroom lighting operational, light cord in reach Bed/Stretcher in lowest position, wheels locked, appropriate side rails in place/Call bell, personal items and telephone in reach/Instruct patient to call for assistance before getting out of bed/chair/stretcher/Non-slip footwear applied when patient is off stretcher/[place] to call system/Physically safe environment - no spills, clutter or unnecessary equipment/Purposeful proactive rounding/Room/bathroom lighting operational, light cord in reach

## 2023-12-29 ENCOUNTER — TRANSCRIPTION ENCOUNTER (OUTPATIENT)
Age: 58
End: 2023-12-29

## 2023-12-29 ENCOUNTER — EMERGENCY (EMERGENCY)
Facility: HOSPITAL | Age: 58
LOS: 1 days | Discharge: HOME | End: 2023-12-29
Attending: EMERGENCY MEDICINE
Payer: MEDICARE

## 2023-12-29 VITALS — HEIGHT: 70 IN | WEIGHT: 169.98 LBS

## 2023-12-29 LAB
ALBUMIN SERPL ELPH-MCNC: 4.9 G/DL — SIGNIFICANT CHANGE UP (ref 3.3–5)
ALBUMIN SERPL ELPH-MCNC: 4.9 G/DL — SIGNIFICANT CHANGE UP (ref 3.3–5)
ALP SERPL-CCNC: 69 U/L — SIGNIFICANT CHANGE UP (ref 40–120)
ALP SERPL-CCNC: 69 U/L — SIGNIFICANT CHANGE UP (ref 40–120)
ALT FLD-CCNC: 28 U/L — SIGNIFICANT CHANGE UP (ref 10–45)
ALT FLD-CCNC: 28 U/L — SIGNIFICANT CHANGE UP (ref 10–45)
ANION GAP SERPL CALC-SCNC: 17 MMOL/L — SIGNIFICANT CHANGE UP (ref 5–17)
ANION GAP SERPL CALC-SCNC: 17 MMOL/L — SIGNIFICANT CHANGE UP (ref 5–17)
APAP SERPL-MCNC: <15 UG/ML — SIGNIFICANT CHANGE UP (ref 10–30)
APAP SERPL-MCNC: <15 UG/ML — SIGNIFICANT CHANGE UP (ref 10–30)
APPEARANCE UR: CLEAR — SIGNIFICANT CHANGE UP
APPEARANCE UR: CLEAR — SIGNIFICANT CHANGE UP
AST SERPL-CCNC: 27 U/L — SIGNIFICANT CHANGE UP (ref 10–40)
AST SERPL-CCNC: 27 U/L — SIGNIFICANT CHANGE UP (ref 10–40)
BASOPHILS # BLD AUTO: 0.05 K/UL — SIGNIFICANT CHANGE UP (ref 0–0.2)
BASOPHILS # BLD AUTO: 0.05 K/UL — SIGNIFICANT CHANGE UP (ref 0–0.2)
BASOPHILS NFR BLD AUTO: 0.6 % — SIGNIFICANT CHANGE UP (ref 0–2)
BASOPHILS NFR BLD AUTO: 0.6 % — SIGNIFICANT CHANGE UP (ref 0–2)
BILIRUB SERPL-MCNC: 0.5 MG/DL — SIGNIFICANT CHANGE UP (ref 0.2–1.2)
BILIRUB SERPL-MCNC: 0.5 MG/DL — SIGNIFICANT CHANGE UP (ref 0.2–1.2)
BILIRUB UR-MCNC: NEGATIVE — SIGNIFICANT CHANGE UP
BILIRUB UR-MCNC: NEGATIVE — SIGNIFICANT CHANGE UP
BUN SERPL-MCNC: 16 MG/DL — SIGNIFICANT CHANGE UP (ref 7–23)
BUN SERPL-MCNC: 16 MG/DL — SIGNIFICANT CHANGE UP (ref 7–23)
CALCIUM SERPL-MCNC: 9.8 MG/DL — SIGNIFICANT CHANGE UP (ref 8.4–10.5)
CALCIUM SERPL-MCNC: 9.8 MG/DL — SIGNIFICANT CHANGE UP (ref 8.4–10.5)
CHLORIDE SERPL-SCNC: 100 MMOL/L — SIGNIFICANT CHANGE UP (ref 96–108)
CHLORIDE SERPL-SCNC: 100 MMOL/L — SIGNIFICANT CHANGE UP (ref 96–108)
CO2 SERPL-SCNC: 23 MMOL/L — SIGNIFICANT CHANGE UP (ref 22–31)
CO2 SERPL-SCNC: 23 MMOL/L — SIGNIFICANT CHANGE UP (ref 22–31)
COLOR SPEC: YELLOW — SIGNIFICANT CHANGE UP
COLOR SPEC: YELLOW — SIGNIFICANT CHANGE UP
CREAT SERPL-MCNC: 1.25 MG/DL — SIGNIFICANT CHANGE UP (ref 0.5–1.3)
CREAT SERPL-MCNC: 1.25 MG/DL — SIGNIFICANT CHANGE UP (ref 0.5–1.3)
DIFF PNL FLD: NEGATIVE — SIGNIFICANT CHANGE UP
DIFF PNL FLD: NEGATIVE — SIGNIFICANT CHANGE UP
EGFR: 66 ML/MIN/1.73M2 — SIGNIFICANT CHANGE UP
EGFR: 66 ML/MIN/1.73M2 — SIGNIFICANT CHANGE UP
EOSINOPHIL # BLD AUTO: 0.08 K/UL — SIGNIFICANT CHANGE UP (ref 0–0.5)
EOSINOPHIL # BLD AUTO: 0.08 K/UL — SIGNIFICANT CHANGE UP (ref 0–0.5)
EOSINOPHIL NFR BLD AUTO: 0.9 % — SIGNIFICANT CHANGE UP (ref 0–6)
EOSINOPHIL NFR BLD AUTO: 0.9 % — SIGNIFICANT CHANGE UP (ref 0–6)
ETHANOL SERPL-MCNC: <10 MG/DL — SIGNIFICANT CHANGE UP (ref 0–10)
ETHANOL SERPL-MCNC: <10 MG/DL — SIGNIFICANT CHANGE UP (ref 0–10)
GLUCOSE SERPL-MCNC: 96 MG/DL — SIGNIFICANT CHANGE UP (ref 70–99)
GLUCOSE SERPL-MCNC: 96 MG/DL — SIGNIFICANT CHANGE UP (ref 70–99)
GLUCOSE UR QL: NEGATIVE MG/DL — SIGNIFICANT CHANGE UP
GLUCOSE UR QL: NEGATIVE MG/DL — SIGNIFICANT CHANGE UP
HCT VFR BLD CALC: 45.2 % — SIGNIFICANT CHANGE UP (ref 39–50)
HCT VFR BLD CALC: 45.2 % — SIGNIFICANT CHANGE UP (ref 39–50)
HGB BLD-MCNC: 16.1 G/DL — SIGNIFICANT CHANGE UP (ref 13–17)
HGB BLD-MCNC: 16.1 G/DL — SIGNIFICANT CHANGE UP (ref 13–17)
IMM GRANULOCYTES NFR BLD AUTO: 0.2 % — SIGNIFICANT CHANGE UP (ref 0–0.9)
IMM GRANULOCYTES NFR BLD AUTO: 0.2 % — SIGNIFICANT CHANGE UP (ref 0–0.9)
KETONES UR-MCNC: ABNORMAL MG/DL
KETONES UR-MCNC: ABNORMAL MG/DL
LEUKOCYTE ESTERASE UR-ACNC: NEGATIVE — SIGNIFICANT CHANGE UP
LEUKOCYTE ESTERASE UR-ACNC: NEGATIVE — SIGNIFICANT CHANGE UP
LYMPHOCYTES # BLD AUTO: 2.17 K/UL — SIGNIFICANT CHANGE UP (ref 1–3.3)
LYMPHOCYTES # BLD AUTO: 2.17 K/UL — SIGNIFICANT CHANGE UP (ref 1–3.3)
LYMPHOCYTES # BLD AUTO: 25 % — SIGNIFICANT CHANGE UP (ref 13–44)
LYMPHOCYTES # BLD AUTO: 25 % — SIGNIFICANT CHANGE UP (ref 13–44)
MCHC RBC-ENTMCNC: 34.9 PG — HIGH (ref 27–34)
MCHC RBC-ENTMCNC: 34.9 PG — HIGH (ref 27–34)
MCHC RBC-ENTMCNC: 35.6 GM/DL — SIGNIFICANT CHANGE UP (ref 32–36)
MCHC RBC-ENTMCNC: 35.6 GM/DL — SIGNIFICANT CHANGE UP (ref 32–36)
MCV RBC AUTO: 98 FL — SIGNIFICANT CHANGE UP (ref 80–100)
MCV RBC AUTO: 98 FL — SIGNIFICANT CHANGE UP (ref 80–100)
MONOCYTES # BLD AUTO: 0.7 K/UL — SIGNIFICANT CHANGE UP (ref 0–0.9)
MONOCYTES # BLD AUTO: 0.7 K/UL — SIGNIFICANT CHANGE UP (ref 0–0.9)
MONOCYTES NFR BLD AUTO: 8.1 % — SIGNIFICANT CHANGE UP (ref 2–14)
MONOCYTES NFR BLD AUTO: 8.1 % — SIGNIFICANT CHANGE UP (ref 2–14)
NEUTROPHILS # BLD AUTO: 5.65 K/UL — SIGNIFICANT CHANGE UP (ref 1.8–7.4)
NEUTROPHILS # BLD AUTO: 5.65 K/UL — SIGNIFICANT CHANGE UP (ref 1.8–7.4)
NEUTROPHILS NFR BLD AUTO: 65.2 % — SIGNIFICANT CHANGE UP (ref 43–77)
NEUTROPHILS NFR BLD AUTO: 65.2 % — SIGNIFICANT CHANGE UP (ref 43–77)
NITRITE UR-MCNC: NEGATIVE — SIGNIFICANT CHANGE UP
NITRITE UR-MCNC: NEGATIVE — SIGNIFICANT CHANGE UP
NRBC # BLD: 0 /100 WBCS — SIGNIFICANT CHANGE UP (ref 0–0)
NRBC # BLD: 0 /100 WBCS — SIGNIFICANT CHANGE UP (ref 0–0)
PCP SPEC-MCNC: SIGNIFICANT CHANGE UP
PCP SPEC-MCNC: SIGNIFICANT CHANGE UP
PH UR: 6 — SIGNIFICANT CHANGE UP (ref 5–8)
PH UR: 6 — SIGNIFICANT CHANGE UP (ref 5–8)
PLATELET # BLD AUTO: 163 K/UL — SIGNIFICANT CHANGE UP (ref 150–400)
PLATELET # BLD AUTO: 163 K/UL — SIGNIFICANT CHANGE UP (ref 150–400)
POTASSIUM SERPL-MCNC: 3.7 MMOL/L — SIGNIFICANT CHANGE UP (ref 3.5–5.3)
POTASSIUM SERPL-MCNC: 3.7 MMOL/L — SIGNIFICANT CHANGE UP (ref 3.5–5.3)
POTASSIUM SERPL-SCNC: 3.7 MMOL/L — SIGNIFICANT CHANGE UP (ref 3.5–5.3)
POTASSIUM SERPL-SCNC: 3.7 MMOL/L — SIGNIFICANT CHANGE UP (ref 3.5–5.3)
PROT SERPL-MCNC: 7.1 G/DL — SIGNIFICANT CHANGE UP (ref 6–8.3)
PROT SERPL-MCNC: 7.1 G/DL — SIGNIFICANT CHANGE UP (ref 6–8.3)
PROT UR-MCNC: NEGATIVE MG/DL — SIGNIFICANT CHANGE UP
PROT UR-MCNC: NEGATIVE MG/DL — SIGNIFICANT CHANGE UP
RBC # BLD: 4.61 M/UL — SIGNIFICANT CHANGE UP (ref 4.2–5.8)
RBC # BLD: 4.61 M/UL — SIGNIFICANT CHANGE UP (ref 4.2–5.8)
RBC # FLD: 12.4 % — SIGNIFICANT CHANGE UP (ref 10.3–14.5)
RBC # FLD: 12.4 % — SIGNIFICANT CHANGE UP (ref 10.3–14.5)
SALICYLATES SERPL-MCNC: <2 MG/DL — LOW (ref 15–30)
SALICYLATES SERPL-MCNC: <2 MG/DL — LOW (ref 15–30)
SODIUM SERPL-SCNC: 140 MMOL/L — SIGNIFICANT CHANGE UP (ref 135–145)
SODIUM SERPL-SCNC: 140 MMOL/L — SIGNIFICANT CHANGE UP (ref 135–145)
SP GR SPEC: 1.01 — SIGNIFICANT CHANGE UP (ref 1–1.03)
SP GR SPEC: 1.01 — SIGNIFICANT CHANGE UP (ref 1–1.03)
UROBILINOGEN FLD QL: 0.2 MG/DL — SIGNIFICANT CHANGE UP (ref 0.2–1)
UROBILINOGEN FLD QL: 0.2 MG/DL — SIGNIFICANT CHANGE UP (ref 0.2–1)
WBC # BLD: 8.67 K/UL — SIGNIFICANT CHANGE UP (ref 3.8–10.5)
WBC # BLD: 8.67 K/UL — SIGNIFICANT CHANGE UP (ref 3.8–10.5)
WBC # FLD AUTO: 8.67 K/UL — SIGNIFICANT CHANGE UP (ref 3.8–10.5)
WBC # FLD AUTO: 8.67 K/UL — SIGNIFICANT CHANGE UP (ref 3.8–10.5)

## 2023-12-29 PROCEDURE — 81003 URINALYSIS AUTO W/O SCOPE: CPT

## 2023-12-29 PROCEDURE — 85025 COMPLETE CBC W/AUTO DIFF WBC: CPT

## 2023-12-29 PROCEDURE — 99285 EMERGENCY DEPT VISIT HI MDM: CPT | Mod: 25

## 2023-12-29 PROCEDURE — 80307 DRUG TEST PRSMV CHEM ANLYZR: CPT

## 2023-12-29 PROCEDURE — 80053 COMPREHEN METABOLIC PANEL: CPT

## 2023-12-29 PROCEDURE — 99285 EMERGENCY DEPT VISIT HI MDM: CPT

## 2023-12-29 PROCEDURE — 93005 ELECTROCARDIOGRAM TRACING: CPT

## 2023-12-29 PROCEDURE — 84443 ASSAY THYROID STIM HORMONE: CPT

## 2023-12-29 NOTE — ED PROVIDER NOTE - OBJECTIVE STATEMENT
Patient is a 58 year-old-male with history of autism, OCD, MDD presents to the ED with worsening stress/anxiety. Reports that he has been having a lot of stress at home taking care of his elderly mother; went to another ED last night and was cleared to go home, however he woke up this morning feeling more anxious and stressed. +thoughts of SI but denies plan. Denies HI or hallucinations. Reports psychiatric hospitalization about 15 years ago due to stress at work.

## 2023-12-29 NOTE — ED ADULT NURSE NOTE - HPI (INCLUDE ILLNESS QUALITY, SEVERITY, DURATION, TIMING, CONTEXT, MODIFYING FACTORS, ASSOCIATED SIGNS AND SYMPTOMS)
noncompliant with home Trazadone x 3 weeks  anxiety worsened severely over last week due to home stressors

## 2023-12-29 NOTE — ED ADULT NURSE NOTE - NSFALLUNIVINTERV_ED_ALL_ED
Bed/Stretcher in lowest position, wheels locked, appropriate side rails in place/Call bell, personal items and telephone in reach/Instruct patient to call for assistance before getting out of bed/chair/stretcher/Non-slip footwear applied when patient is off stretcher/[place] to call system/Physically safe environment - no spills, clutter or unnecessary equipment/Purposeful proactive rounding/Room/bathroom lighting operational, light cord in reach Bed/Stretcher in lowest position, wheels locked, appropriate side rails in place/Call bell, personal items and telephone in reach/Instruct patient to call for assistance before getting out of bed/chair/stretcher/Non-slip footwear applied when patient is off stretcher/[place] to call system/Physically safe environment - no spills, clutter or unnecessary equipment/Purposeful proactive rounding/Room/bathroom lighting operational, light cord in reach

## 2023-12-29 NOTE — ED ADULT NURSE NOTE - OBJECTIVE STATEMENT
Pt is a 58y M pmh anxiety and depression brought in by EMS c/o noncompliance with home meds and increased anxiety. Pt states he has home stressors including a recent family member hospitalization that is causing him to feel more anxious than usual. Pt states "I went to another ER for my own medical problem yesterday, and it caused me a lot of distress. I am far worse today. I am having suicidal ideation as well." Pt placed in ED BH room, will be wanded by security and belongings will be stored. Pt A&Ox4, ambulatory, independent.

## 2023-12-29 NOTE — ED ADULT NURSE REASSESSMENT NOTE - NS ED NURSE REASSESS COMMENT FT1
Pt arrived to ED via EMS endorsing SI. Security called to wand pt and secure belongings. Pt cooperative, pt undressed completely and wanded by security. Pt belongings secured and placed in security cabinet, and valuables stored in envelope #[id number] and locked in safe. 1:1 initiated due to SI and for patient safety. 1:1 at bedside, safety maintained. Pt arrived to ED via EMS endorsing SI. Security called to wand pt and secure belongings. Pt cooperative, pt undressed completely and wanded by security. Pt belongings secured and placed in security cabinet, and valuables stored in envelope #[id number] and locked in safe. 1:1 initiated due to SI and for patient safety. 1:1 at bedside, safety maintained.

## 2023-12-29 NOTE — ED PROVIDER NOTE - CLINICAL SUMMARY MEDICAL DECISION MAKING FREE TEXT BOX
Patient is a 58 year-old-male with history of autism, OCD, MDD presents to the ED with worsening stress/anxiety. No medical complaints at this point. Will obtain medical clearance. Obtain collateral information. Patient is a 58 year-old-male with history of autism, OCD, MDD presents to the ED with worsening stress/anxiety. No medical complaints at this point. Will obtain medical clearance. Obtain collateral information.    Dr. Pathak (Attending Physician)

## 2023-12-29 NOTE — ED PROVIDER NOTE - PATIENT PORTAL LINK FT
You can access the FollowMyHealth Patient Portal offered by [facility] by registering at the following website: [email or web address]. By joining [employer]’s FollowMyHealth portal, you will also be able to view your health information using other applications (apps) compatible with our system. You can access the FollowMyHealth Patient Portal offered by [facility] by registering at the following website: [email or web address]. By joining [employer]’s FollowMyHealth portal, you will also be able to view your health information using other applications (apps) compatible with our system.

## 2023-12-29 NOTE — ED ADULT NURSE NOTE - ABNORMAL MOVEMENTS
Unclear if pt with h/o afib. Pt denies, EKG on admission with suspected Afib, CHADVASC score of 5; also has a confirmed Left ventricular thrombus  -  s/p heparin gtt, now INR >3, over last 5 days 25 mg of coumadin thus avg daily dose of 5 mg, suspect home dose should be closer to 4 mg daily   - no confirmed outpatient appt and not on stable coumadin dose, OP offices closed, recommended to daughter to stay IP until OP f/u can be arranged   - monitor on tele No abnormal movements

## 2023-12-29 NOTE — ED PROVIDER NOTE - PHYSICAL EXAMINATION
Vitals: I have reviewed the patients vital signs  General: well dressed, appears anxious, pacing   HEENT: Atraumatic, normocephalic, airway patent  Eyes: EOMI, tracking appropriately  Neck: no tracheal deviation, no JVD  Chest/Lungs: no trauma, symmetric chest rise, speaking in complete sentences, no WOB  Heart: skin and extremities well perfused, regular rate and rhythm  Abdomen: soft, nontender and nondistended   Neuro: A+Ox3, ambulating without difficulty, CN grossly intact  Psych: anxious, +SI, no HI, does not appear internally occupied   MSK: strength at baseline in all extremities, no muscle wasting or atrophy  Skin: no cyanosis, no jaundice, no new emergent lesions

## 2023-12-29 NOTE — ED PROVIDER NOTE - NSFOLLOWUPINSTRUCTIONS_ED_ALL_ED_FT
You were seen in the emergency department for worsening stress/anxiety.   Please follow up with your primary care doctor within 48 hours for continuation of care.   Please follow up with your psychiatrist for further management.     Return to the emergency department if you experience any new/concerning/worsening symptoms such as but not limited to: fever (>100.3F), intractable nausea, vomiting, chest pain, shortness of breath, abdominal pain, thoughts of hurting self or other people.

## 2023-12-29 NOTE — ED ADULT TRIAGE NOTE - HEIGHT IN CM
10/18/2022    Patient: Meg Casey   YOB: 1953   Date of Visit: 10/18/2022     Marlene Blue MD  [address] [address] [address] [facility] [address]  Via Fax: [phone]    Dear Marlene Blue MD,      Thank you for referring Ms. Meg Casey to [address] [place] [address] for evaluation. My notes for this consultation are attached. If you have questions, please do not hesitate to call me. I look forward to following your patient along with you.       Sincerely,    Rupinder Cage NP
177.8

## 2023-12-29 NOTE — ED PROVIDER NOTE - PROGRESS NOTE DETAILS
Lorenzo PGY3   Discussed with patient's brother (Hever Guadalupe @[phone]) - patient has been more anxious/stressed due to home situation. Patient was complaining of migraine headache and hallucination, unclear what kind. Lorenzo PGY3   Discussed with patient's brother (Hever Guadalupe @[phone]) - patient has been more anxious/stressed due to home situation. Patient was complaining of migraine headache and hallucination, unclear what kind. Lorenzo PGY3  Telepsych consulted. Lorenzo PGY3  Discussed with telepsych - okay to discharge per psych perspective. Return precautions reviewed. Recommends PMD follow up.

## 2023-12-30 VITALS
DIASTOLIC BLOOD PRESSURE: 81 MMHG | HEART RATE: 82 BPM | TEMPERATURE: 98 F | RESPIRATION RATE: 18 BRPM | SYSTOLIC BLOOD PRESSURE: 119 MMHG | OXYGEN SATURATION: 98 %

## 2023-12-30 LAB
AMPHET UR-MCNC: NEGATIVE — SIGNIFICANT CHANGE UP
AMPHET UR-MCNC: NEGATIVE — SIGNIFICANT CHANGE UP
BARBITURATES UR SCN-MCNC: NEGATIVE — SIGNIFICANT CHANGE UP
BARBITURATES UR SCN-MCNC: NEGATIVE — SIGNIFICANT CHANGE UP
BENZODIAZ UR-MCNC: NEGATIVE — SIGNIFICANT CHANGE UP
BENZODIAZ UR-MCNC: NEGATIVE — SIGNIFICANT CHANGE UP
COCAINE METAB.OTHER UR-MCNC: NEGATIVE — SIGNIFICANT CHANGE UP
COCAINE METAB.OTHER UR-MCNC: NEGATIVE — SIGNIFICANT CHANGE UP
METHADONE UR-MCNC: NEGATIVE — SIGNIFICANT CHANGE UP
METHADONE UR-MCNC: NEGATIVE — SIGNIFICANT CHANGE UP
OPIATES UR-MCNC: NEGATIVE — SIGNIFICANT CHANGE UP
OPIATES UR-MCNC: NEGATIVE — SIGNIFICANT CHANGE UP
OXYCODONE UR-MCNC: NEGATIVE — SIGNIFICANT CHANGE UP
OXYCODONE UR-MCNC: NEGATIVE — SIGNIFICANT CHANGE UP
PCP UR-MCNC: NEGATIVE — SIGNIFICANT CHANGE UP
PCP UR-MCNC: NEGATIVE — SIGNIFICANT CHANGE UP
THC UR QL: NEGATIVE — SIGNIFICANT CHANGE UP
THC UR QL: NEGATIVE — SIGNIFICANT CHANGE UP
TSH SERPL-MCNC: 3.59 UIU/ML — SIGNIFICANT CHANGE UP (ref 0.27–4.2)
TSH SERPL-MCNC: 3.59 UIU/ML — SIGNIFICANT CHANGE UP (ref 0.27–4.2)

## 2023-12-30 PROCEDURE — 90792 PSYCH DIAG EVAL W/MED SRVCS: CPT | Mod: 95

## 2023-12-30 NOTE — ED BEHAVIORAL HEALTH ASSESSMENT NOTE - SAFETY PLAN ADDT'L DETAILS
Safety plan discussed with.../Education provided regarding environmental safety / lethal means restriction/Provision of National Suicide Prevention Lifeline 5-385-565-ZYFH (2551) Safety plan discussed with.../Education provided regarding environmental safety / lethal means restriction/Provision of National Suicide Prevention Lifeline 5-932-257-XKVJ (2318)

## 2023-12-30 NOTE — ED BEHAVIORAL HEALTH ASSESSMENT NOTE - OTHER PAST PSYCHIATRIC HISTORY (INCLUDE DETAILS REGARDING ONSET, COURSE OF ILLNESS, INPATIENT/OUTPATIENT TREATMENT)
3 prior hospitalizations (2008, 1990s, 1980s), no prior SA, history of NSSI at age 20, in outpatient treatment with Dr. Duffy (psychiatrist) and Christen (therapist)

## 2023-12-30 NOTE — ED BEHAVIORAL HEALTH ASSESSMENT NOTE - SUMMARY
58 year old male, domiciled with elderly mother (currently hospitalized), on SSDI, with no known PMH, PPH ASD (Asperger's), ADHD, OCD, 3 prior hospitalizations (2008, 1990s, 1980s), no prior SA, history of NSSI at age 20, no legal history, no substance abuse, in outpatient treatment with Dr. Duffy (psychiatrist) and Christen (therapist), BIBEMS a/b self for ongoing anxiety. Overall pt's current presentation is most c/w his chronic anxiety and cognitive rigidity related to his ASD/OCD and difficulty tolerating a change in his expectations.  He denies current SI; per chart review hx transient fleeting passive SI w/o plan/intent. Patient is future oriented, treatment seeking, and has a robust outpatient team. Patient does not meet criteria for involuntary hospitalization at this time. Patient is psychiatrically cleared for discharge. Safety plan completed. Follow-up with outpatient therapist on Tuesday, January 2.

## 2023-12-30 NOTE — ED BEHAVIORAL HEALTH ASSESSMENT NOTE - NSBHATTESTBILLING_PSY_A_CORE
[id number] diagnostic evaluation with medical services [id number] diagnostic evaluation with medical services

## 2023-12-30 NOTE — ED BEHAVIORAL HEALTH ASSESSMENT NOTE - NS ED BHA TELEPSYCH PROVIDER LOCATION
[address] [facility], [place], [place] [address] [facility], [place]

## 2023-12-30 NOTE — ED BEHAVIORAL HEALTH ASSESSMENT NOTE - NS ED BHA TELEPSYCH PATIENT LOCATION
[facility] Worsening of Condition, Death, or Disability if Patient Does Not Transfer/Obtain Level of Care/Service Not Available at this Facility/Continuity of Care at Other Facility

## 2023-12-30 NOTE — ED BEHAVIORAL HEALTH ASSESSMENT NOTE - NSBHMSEATTEN_PSY_A_CORE
No symptoms, possible chronic colonization. Has been on Meropenem  - care discussed with Dr. Pérez: recommend course of linezolid for VRE UTI  - UCx with VRE  - CTAP on this admission without prostate abscess Normal

## 2023-12-30 NOTE — BH SAFETY PLAN - SUICIDE PREVENTION LIFELINE PHONES
Suicide Prevention Lifeline Phone: [phone]- TALK ([phone]) Suicide Prevention Lifeline Phone: 3-625-560- TALK (2588)

## 2023-12-30 NOTE — ED BEHAVIORAL HEALTH ASSESSMENT NOTE - HPI (INCLUDE ILLNESS QUALITY, SEVERITY, DURATION, TIMING, CONTEXT, MODIFYING FACTORS, ASSOCIATED SIGNS AND SYMPTOMS)
58 year old male, domiciled with elderly mother (currently hospitalized), on SSDI, with no known PMH, PPH ASD (Asperger's), ADHD, OCD, 3 prior hospitalizations (2008, 1990s, 1980s), no prior SA, history of NSSI at age 20, no legal history, no substance abuse, in outpatient treatment with Dr. Duffy (psychiatrist) and Christen (therapist), BIBEMS a/b self for ongoing anxiety.     Patient presents with numerous similar presentations, most recently presented on 12/24/23 also with increased anxiety related to taking care of his elderly mother. Pt was discharged, recommended to follow-up with outpatient providers, next appointments on 1/2 with therapist and 1/8 with psychiatrist.    On eval, patient initially with anxious affect but calmer through interview, oddly related, with monotone voice, mildly concrete thought process but otherwise linear, well-engaged in interview. He states that he has been feeling on edge, particularly when he is alone at his home. States that he has been coming to the ED to talk with providers out of concern for his elderly mother (currently hospitalized at [facility]), felt particularly anxious today as he had been reflecting on prior encounter 12/24, states he felt that he had 'forgot to mention things' to prior provider, attributes his discomfort to his OCD and wish to remedy this. He reflects that at times he would continue to be disturbed by his mother being hospitalized and her overall deteriorating health, would experience 'sustained anxiety' for hours that eventually improves as he distracts himself. States that he also recognizes that his mother is elderly and that this is her tenth hospitalization in 3 years, feels that she has good and bad days, states that his brother (Hever) also tries to be supportive of him but that he tries not to be an additional burden to him. He denies sleep disturbance, appetite change, SI/HI/AH. He reports ongoing adherence to his home medication regimen - states that he has been unable to contact his outpatient providers due to the holiday break but has reached out to crisis lines to find someone to speak with, though reports feeling that he is sometimes misunderstood/dismissed by providers. He denies any substance use. Psychoeducation was provided. Patient completes safety plan with provider, discussing coping strategies and support system (incl. OCD support group). Discussed indications to return to ED. 58 year old male, domiciled with elderly mother (currently hospitalized), on SSDI, with no known PMH, PPH ASD (Asperger's), ADHD, OCD, 3 prior hospitalizations (2008, 1990s, 1980s), no prior SA, history of NSSI at age 20, no legal history, no substance abuse, in outpatient treatment with Dr. Duffy (psychiatrist) and Christen (therapist), BIBEMS a/b self for ongoing anxiety.     Patient presents with numerous similar presentations, most recently presented on 12/24/23 also with increased anxiety related to taking care of his elderly mother. Pt was discharged, recommended to follow-up with outpatient providers, next appointments on 1/2 with therapist and 1/8 with psychiatrist.    On eval, patient initially with anxious affect but calmer through interview, oddly related, with monotone voice, mildly concrete thought process but otherwise linear, well-engaged in interview. He states that he has been feeling on edge, particularly when he is alone at his home. States that he has been coming to the ED to talk with providers out of concern for his elderly mother (currently hospitalized at [facility]), felt particularly anxious today as he had been reflecting on prior encounter 12/24, states he felt that he had 'forgot to mention things' to prior provider, attributes his discomfort to his OCD and wish to remedy this. He reflects that at times he would continue to be disturbed by his mother being hospitalized and her overall deteriorating health, would experience 'sustained anxiety' for hours that eventually improves as he distracts himself. States that he also recognizes that his mother is elderly and that this is her tenth hospitalization in 3 years, feels that she has good and bad days, states that his brother (Hever) also tries to be supportive of him but that he tries not to be an additional burden to him. He denies sleep disturbance, appetite change, SI/HI/AH. He reports ongoing adherence to his home medication regimen - states that he has been unable to contact his outpatient providers due to the holiday break but has reached out to crisis lines to find someone to speak with, though reports feeling that he is sometimes misunderstood/dismissed by providers. He denies any substance use. Psychoeducation was provided. Patient completes safety plan with provider, discussing coping strategies and support system (incl. OCD support group). Discussed indications to return to ED.

## 2023-12-30 NOTE — ED BEHAVIORAL HEALTH ASSESSMENT NOTE - NSSUICPROTFACT_PSY_ALL_CORE
Responsibility to children, family, or others/Identifies reasons for living/Fear of death or the actual act of killing self/Positive therapeutic relationships/Beloved pets

## 2024-06-08 ENCOUNTER — NON-APPOINTMENT (OUTPATIENT)
Age: 59
End: 2024-06-08

## 2024-06-28 ENCOUNTER — APPOINTMENT (OUTPATIENT)
Dept: NEUROLOGY | Facility: CLINIC | Age: 59
End: 2024-06-28
Payer: MEDICARE

## 2024-06-28 DIAGNOSIS — G43.109 MIGRAINE WITH AURA, NOT INTRACTABLE, W/OUT STATUS MIGRAINOSUS: ICD-10-CM

## 2024-06-28 DIAGNOSIS — F84.0 AUTISTIC DISORDER: ICD-10-CM

## 2024-06-28 PROCEDURE — 99204 OFFICE O/P NEW MOD 45 MIN: CPT

## 2024-07-26 NOTE — ED ADULT TRIAGE NOTE - IDEAL BODY WEIGHT(KG)
73
You can access the FollowMyHealth Patient Portal offered by [facility] by registering at the following website: [email or web address]. By joining [employer]’s FollowMyHealth portal, you will also be able to view your health information using other applications (apps) compatible with our system.
